# Patient Record
Sex: FEMALE | Race: WHITE | NOT HISPANIC OR LATINO | Employment: OTHER | URBAN - METROPOLITAN AREA
[De-identification: names, ages, dates, MRNs, and addresses within clinical notes are randomized per-mention and may not be internally consistent; named-entity substitution may affect disease eponyms.]

---

## 2019-06-14 ENCOUNTER — HOSPITAL ENCOUNTER (EMERGENCY)
Facility: HOSPITAL | Age: 69
Discharge: HOME | End: 2019-06-14
Attending: EMERGENCY MEDICINE
Payer: MEDICAID

## 2019-06-14 VITALS
HEIGHT: 62 IN | SYSTOLIC BLOOD PRESSURE: 162 MMHG | OXYGEN SATURATION: 96 % | TEMPERATURE: 98 F | RESPIRATION RATE: 22 BRPM | HEART RATE: 91 BPM | DIASTOLIC BLOOD PRESSURE: 72 MMHG

## 2019-06-14 DIAGNOSIS — I10 HYPERTENSION, UNSPECIFIED TYPE: ICD-10-CM

## 2019-06-14 DIAGNOSIS — H61.21 IMPACTED CERUMEN OF RIGHT EAR: ICD-10-CM

## 2019-06-14 DIAGNOSIS — Z86.73 HISTORY OF CVA (CEREBROVASCULAR ACCIDENT) WITHOUT RESIDUAL DEFICITS: ICD-10-CM

## 2019-06-14 DIAGNOSIS — R42 DIZZY: ICD-10-CM

## 2019-06-14 DIAGNOSIS — R00.2 PALPITATIONS: ICD-10-CM

## 2019-06-14 DIAGNOSIS — R42 VERTIGO: Primary | ICD-10-CM

## 2019-06-14 LAB
ALBUMIN SERPL-MCNC: 3.8 G/DL
ALP SERPL-CCNC: 109 U/L (ref 42–141)
BILIRUB SERPL-MCNC: 0.7 MG/DL (ref 0.2–1.6)
BILIRUBIN, POC UA: NEGATIVE
BLOOD, POC UA: ABNORMAL
BUN SERPL-MCNC: 26 MG/DL (ref 7–22)
CALCIUM SERPL-MCNC: 10 MG/DL (ref 8–10.3)
CHLORIDE SERPL-SCNC: 105 MMOL/L (ref 98–108)
CLARITY, POC UA: CLEAR
COLOR, POC UA: YELLOW
CREAT SERPL-MCNC: 1.4 MG/DL (ref 0.6–1.2)
GLUCOSE SERPL-MCNC: 194 MG/DL (ref 73–118)
GLUCOSE, POC UA: NEGATIVE
KETONES, POC UA: NEGATIVE
LEUKOCYTE EST, POC UA: ABNORMAL
NITRITE, POC UA: NEGATIVE
PH UR STRIP: 5.5 [PH]
POC ALT (SGPT): 19 U/L (ref 10–47)
POC AST (SGOT): 21 U/L (ref 11–38)
POC CARDIAC TROPONIN I: 0 NG/ML
POC TCO2: 26 MMOL/L (ref 18–33)
POCT GLUCOSE: 217 MG/DL (ref 70–110)
POTASSIUM BLD-SCNC: 4.8 MMOL/L (ref 3.6–5.1)
PROTEIN, POC UA: ABNORMAL
PROTEIN, POC: 8 G/DL (ref 6.4–8.1)
SAMPLE: NORMAL
SODIUM BLD-SCNC: 140 MMOL/L (ref 128–145)
SPECIFIC GRAVITY, POC UA: 1.02
UROBILINOGEN, POC UA: 0.2 E.U./DL

## 2019-06-14 PROCEDURE — 85025 COMPLETE CBC W/AUTO DIFF WBC: CPT | Mod: ER

## 2019-06-14 PROCEDURE — 81003 URINALYSIS AUTO W/O SCOPE: CPT | Mod: ER

## 2019-06-14 PROCEDURE — 80053 COMPREHEN METABOLIC PANEL: CPT | Mod: ER

## 2019-06-14 PROCEDURE — 93010 EKG 12-LEAD: ICD-10-PCS | Mod: ,,, | Performed by: INTERNAL MEDICINE

## 2019-06-14 PROCEDURE — 84484 ASSAY OF TROPONIN QUANT: CPT | Mod: ER

## 2019-06-14 PROCEDURE — 93005 ELECTROCARDIOGRAM TRACING: CPT | Mod: ER

## 2019-06-14 PROCEDURE — 25000003 PHARM REV CODE 250: Mod: ER | Performed by: EMERGENCY MEDICINE

## 2019-06-14 PROCEDURE — 99285 EMERGENCY DEPT VISIT HI MDM: CPT | Mod: 25,ER

## 2019-06-14 PROCEDURE — 93010 ELECTROCARDIOGRAM REPORT: CPT | Mod: ,,, | Performed by: INTERNAL MEDICINE

## 2019-06-14 RX ORDER — ATENOLOL 50 MG/1
50 TABLET ORAL 2 TIMES DAILY
Qty: 60 TABLET | Refills: 0 | OUTPATIENT
Start: 2019-06-14 | End: 2020-05-10

## 2019-06-14 RX ORDER — MECLIZINE HYDROCHLORIDE 25 MG/1
25 TABLET ORAL
Status: COMPLETED | OUTPATIENT
Start: 2019-06-14 | End: 2019-06-14

## 2019-06-14 RX ORDER — ACETAMINOPHEN 325 MG/1
650 TABLET ORAL
Status: COMPLETED | OUTPATIENT
Start: 2019-06-14 | End: 2019-06-14

## 2019-06-14 RX ORDER — MECLIZINE HYDROCHLORIDE 25 MG/1
25 TABLET ORAL 3 TIMES DAILY PRN
Qty: 20 TABLET | Refills: 0 | Status: SHIPPED | OUTPATIENT
Start: 2019-06-14 | End: 2021-01-08

## 2019-06-14 RX ORDER — ATENOLOL 50 MG/1
50 TABLET ORAL 2 TIMES DAILY
COMMUNITY
End: 2020-05-10

## 2019-06-14 RX ORDER — ATORVASTATIN CALCIUM 80 MG/1
80 TABLET, FILM COATED ORAL DAILY
Qty: 30 TABLET | Refills: 0 | Status: SHIPPED | OUTPATIENT
Start: 2019-06-14 | End: 2020-06-13

## 2019-06-14 RX ORDER — BENAZEPRIL HYDROCHLORIDE 40 MG/1
40 TABLET ORAL DAILY
Qty: 30 TABLET | Refills: 0 | OUTPATIENT
Start: 2019-06-14 | End: 2020-05-10

## 2019-06-14 RX ORDER — ATORVASTATIN CALCIUM 80 MG/1
80 TABLET, FILM COATED ORAL DAILY
COMMUNITY

## 2019-06-14 RX ORDER — BENAZEPRIL HYDROCHLORIDE 40 MG/1
40 TABLET ORAL DAILY
COMMUNITY
End: 2020-05-10

## 2019-06-14 RX ADMIN — ACETAMINOPHEN 650 MG: 325 TABLET, FILM COATED ORAL at 04:06

## 2019-06-14 RX ADMIN — MECLIZINE HYDROCHLORIDE 25 MG: 25 TABLET ORAL at 04:06

## 2019-06-14 NOTE — ED PROVIDER NOTES
"Encounter Date: 6/14/2019    SCRIBE #1 NOTE: I, Carly Baez, am scribing for, and in the presence of,  Dr. Mancilla. I have scribed the following portions of the note - Other sections scribed: HPI, ROS, PE.       History     Chief Complaint   Patient presents with    Dizziness     Pt c/o dizziness, posterior headache, and "my heart is beating too fast"     CC: HA; dizziness  69 y.o female with HTN, HLD, GERD, and hx of stroke presents to the ED with a poster HA, dizziness, and  intermittent palpitations for 3 days. She takes prescription for her palpitations, but is out of it.She also reports she has been out of her HTN medication for 4 to 5 days due to not being able to get her medications refilled. She notes a cough and post tussive emesis. She denies chest pain, leg swelling,  abdominal pain, diarrhea, nausea, fever, neck pain, changes in vision, constipation, changes in urination. She also notes SOB with walking, rhinorrhea, stuffy ears, and chronic back pain.     The history is provided by the patient. No  was used.     Review of patient's allergies indicates:  No Known Allergies  Past Medical History:   Diagnosis Date    Diabetes mellitus     GERD (gastroesophageal reflux disease)     Hyperlipidemia     Hypertension     Stroke      Past Surgical History:   Procedure Laterality Date    Abdominal Mesh Placement       History reviewed. No pertinent family history.  Social History     Tobacco Use    Smoking status: Never Smoker    Smokeless tobacco: Never Used   Substance Use Topics    Alcohol use: Not Currently    Drug use: Never     Review of Systems   Constitutional: Negative for fever.   HENT: Positive for ear pain (feels stuffed up ) and rhinorrhea.    Eyes: Negative for visual disturbance.   Respiratory: Positive for cough and shortness of breath (with walking).    Cardiovascular: Positive for palpitations. Negative for chest pain and leg swelling.   Gastrointestinal: " Positive for vomiting. Negative for abdominal pain, constipation, diarrhea and nausea.   Genitourinary: Negative for decreased urine volume.   Musculoskeletal: Positive for back pain (chronic). Negative for neck pain.   Neurological: Positive for dizziness and headaches (posterior).   All other systems reviewed and are negative.      Physical Exam     Initial Vitals [06/14/19 1606]   BP Pulse Resp Temp SpO2   (!) 177/85 97 18 99 °F (37.2 °C) 98 %      MAP       --         Physical Exam    Nursing note and vitals reviewed.  Constitutional: She appears well-developed and well-nourished. She is not diaphoretic. No distress.   HENT:   Head: Normocephalic and atraumatic.   Right Ear: Tympanic membrane, external ear and ear canal normal. Tympanic membrane is not erythematous. No middle ear effusion.   Left Ear: Tympanic membrane, external ear and ear canal normal. Tympanic membrane is not erythematous.  No middle ear effusion.   Ears:    Eyes: Conjunctivae and EOM are normal. Pupils are equal, round, and reactive to light.   Neck: Normal range of motion. Neck supple.   Cardiovascular: Normal rate, regular rhythm, normal heart sounds and intact distal pulses. Exam reveals no gallop and no friction rub.    No murmur heard.  Pulmonary/Chest: Breath sounds normal. No respiratory distress. She has no wheezes. She has no rhonchi. She has no rales.   Abdominal: Soft. There is no tenderness.   Musculoskeletal: Normal range of motion.   Neurological: She is alert and oriented to person, place, and time. No cranial nerve deficit or sensory deficit. GCS score is 15. GCS eye subscore is 4. GCS verbal subscore is 5. GCS motor subscore is 6.   Skin: Skin is warm and dry. Capillary refill takes less than 2 seconds.   Psychiatric: She has a normal mood and affect. Her behavior is normal.         ED Course   Procedures  Labs Reviewed   POCT URINALYSIS W/O SCOPE - Abnormal; Notable for the following components:       Result Value     Glucose, UA Negative (*)     Bilirubin, UA Negative (*)     Ketones, UA Negative (*)     Blood, UA Trace-intact (*)     Protein, UA Trace (*)     Nitrite, UA Negative (*)     Leukocytes, UA Trace (*)     All other components within normal limits   POCT CMP - Abnormal; Notable for the following components:    POC BUN 26 (*)     POC Creatinine 1.4 (*)     POC Glucose 194 (*)     All other components within normal limits   POCT GLUCOSE - Abnormal; Notable for the following components:    POCT Glucose 217 (*)     All other components within normal limits   TROPONIN ISTAT   POCT URINALYSIS W/O SCOPE   POCT CBC   POCT GLUCOSE MONITORING CONTINUOUS   POCT CMP   POCT TROPONIN     EKG Readings: (Independently Interpreted)   Normal sinus rhythm  Ventricular rate of 94  No ST segment elevation  Nl QT  Nl axis          Imaging Results           CT Head Without Contrast (Final result)  Result time 06/14/19 18:29:10    Final result by Simona Harrison MD (06/14/19 18:29:10)                 Impression:      Nonacute left occipital infarct.  Age-indeterminate left thalamic infarct.    Findings discussed with Dr. Caicedo at 18:20 on 06/14/2019.    This report was flagged in Epic as abnormal.      Electronically signed by: Simona Harrison  Date:    06/14/2019  Time:    18:29             Narrative:    EXAMINATION:  CT OF THE HEAD WITHOUT    CLINICAL HISTORY:  Dizziness and giddinessDizziness;    TECHNIQUE:  5 mm unenhanced axial images were obtained from the skull base to the vertex.    COMPARISON:  None.    FINDINGS:  The ventricles, basal cisterns, and cortical sulci are within normal limits for patient's stated age. There is an age-indeterminate lacunar infarct in the left thalamus.  There is a nonacute territorial infarct involving the left PCA involving the left occipital lobe.  No acute intracranial hemorrhage is detected.  Senescent calcifications are seen in bilateral basal ganglia.  No midline shift is detected.                                  Medical Decision Making:   Initial Assessment:   69 y.o female presents to the ED with a posterior Ha and palpitations for a few days.. Physical exam findings are significant for cerumen in the right ear canal. Heart and lungs sound normal. Good distal pulses. Sensation grossly intact. Neurovascularly intact. Abdomen benign. PERRL and EOM normal.   Clinical Tests:   Lab Tests: Ordered and Reviewed  Medical Tests: Ordered and Reviewed  ED Management:  I will order an EKG, POCT CMP, CBC, troponin, urinalysis, glucose, and CT of the head.  CT shows large widespread old infarct.  There is an area concerning for lacunar infarct that is age indeterminate.  Patient has no neurological deficits.  I do not feel that she is be admitted to the hospital for this.  Labs were significant for mild renal insufficiency.  Patient felt better after meclizine.  The nurse also cleared some debris from her left ear.  Patient's will be prescribed meclizine as well as her blood pressure medications.            Scribe Attestation:   Scribe #1: I performed the above scribed service and the documentation accurately describes the services I performed. I attest to the accuracy of the note.       I, Dr. Gilma Mancilla, personally performed the services described in this documentation. All medical record entries made by the scribe were at my direction and in my presence.  I have reviewed the chart and agree that the record reflects my personal performance and is accurate and complete. Gilma Mancilla MD.  6:53 PM 06/14/2019             Clinical Impression:     1. Vertigo    2. Palpitations    3. Dizzy    4. Impacted cerumen of right ear    5. Hypertension, unspecified type    6. History of CVA (cerebrovascular accident) without residual deficits                                   Gilma Mancilla MD  06/14/19 3896

## 2019-06-14 NOTE — ED NOTES
Pt reports she has been out of Atenolol 50mg BID, Benazepril 50mg QD, and Lipitor 80mg QD for a few days.

## 2019-08-11 ENCOUNTER — HOSPITAL ENCOUNTER (EMERGENCY)
Facility: HOSPITAL | Age: 69
Discharge: HOME | End: 2019-08-11
Attending: EMERGENCY MEDICINE

## 2019-08-11 VITALS
HEART RATE: 99 BPM | TEMPERATURE: 98 F | BODY MASS INDEX: 38.46 KG/M2 | WEIGHT: 209 LBS | RESPIRATION RATE: 16 BRPM | OXYGEN SATURATION: 99 % | DIASTOLIC BLOOD PRESSURE: 89 MMHG | SYSTOLIC BLOOD PRESSURE: 145 MMHG | HEIGHT: 62 IN

## 2019-08-11 DIAGNOSIS — J32.9 RHINOSINUSITIS: Primary | ICD-10-CM

## 2019-08-11 DIAGNOSIS — R05.9 COUGH: ICD-10-CM

## 2019-08-11 LAB — POCT GLUCOSE: 149 MG/DL (ref 70–110)

## 2019-08-11 PROCEDURE — 99284 EMERGENCY DEPT VISIT MOD MDM: CPT | Mod: ER

## 2019-08-11 RX ORDER — BENZONATATE 100 MG/1
100 CAPSULE ORAL 3 TIMES DAILY PRN
Qty: 20 CAPSULE | Refills: 0 | Status: SHIPPED | OUTPATIENT
Start: 2019-08-11 | End: 2019-08-21

## 2019-08-11 RX ORDER — FLUTICASONE PROPIONATE 50 MCG
2 SPRAY, SUSPENSION (ML) NASAL DAILY
Qty: 16 G | Refills: 0 | Status: SHIPPED | OUTPATIENT
Start: 2019-08-11 | End: 2021-01-08

## 2019-08-11 RX ORDER — MONTELUKAST SODIUM 10 MG/1
10 TABLET ORAL NIGHTLY
Qty: 30 TABLET | Refills: 0 | Status: SHIPPED | OUTPATIENT
Start: 2019-08-11 | End: 2019-09-10

## 2019-08-11 RX ORDER — LORATADINE 10 MG/1
10 TABLET ORAL DAILY
Qty: 60 TABLET | Refills: 0 | COMMUNITY
Start: 2019-08-11 | End: 2020-08-10

## 2019-08-11 RX ORDER — PROMETHAZINE HYDROCHLORIDE AND DEXTROMETHORPHAN HYDROBROMIDE 6.25; 15 MG/5ML; MG/5ML
5 SYRUP ORAL NIGHTLY PRN
Qty: 118 ML | Refills: 0 | Status: SHIPPED | OUTPATIENT
Start: 2019-08-11 | End: 2019-08-21

## 2019-08-11 NOTE — ED PROVIDER NOTES
Encounter Date: 8/11/2019    SCRIBE #1 NOTE: I, Carly Baez, am scribing for, and in the presence of,  Dr. Calderon . I have scribed the following portions of the note - Other sections scribed: HPI, ROS, PE .       History     Chief Complaint   Patient presents with    Cough     pt has been having a productive cough with green sputum for 3 days.      69 y.o female with seasonal allergies, HTN, DM, and HLD present to the ED with an acute productive cough with green sputum for 3 days. She took benadryl last night, but none today. She notes sore throat, wheezing, and feeling winded with coughing spells. She denies fever, N/V/D, chest pain, SOB, or trouble swallowing. No hx of asthma. Non smoker. She takes metformin for DM. She states she ran out of her HTN medication (atenolol)  for a few days.     The history is provided by the patient.     Review of patient's allergies indicates:   Allergen Reactions    Sulfa (sulfonamide antibiotics)      Past Medical History:   Diagnosis Date    Diabetes mellitus     GERD (gastroesophageal reflux disease)     Hyperlipidemia     Hypertension     Stroke      Past Surgical History:   Procedure Laterality Date    Abdominal Mesh Placement       History reviewed. No pertinent family history.  Social History     Tobacco Use    Smoking status: Never Smoker    Smokeless tobacco: Never Used   Substance Use Topics    Alcohol use: Not Currently    Drug use: Never     Review of Systems   Constitutional: Negative for fever.   HENT: Positive for congestion, postnasal drip, sneezing and sore throat. Negative for rhinorrhea.    Eyes: Positive for itching (and watery).   Respiratory: Positive for cough (productive ) and wheezing. Negative for shortness of breath.    Cardiovascular: Negative for chest pain, palpitations and leg swelling.   Gastrointestinal: Negative for abdominal pain, diarrhea, nausea and vomiting.   Neurological: Negative for dizziness, syncope and weakness.   All other  systems reviewed and are negative.      Physical Exam     Initial Vitals [08/11/19 1610]   BP Pulse Resp Temp SpO2   (!) 164/76 103 18 98.4 °F (36.9 °C) 96 %      MAP       --         Physical Exam    Nursing note and vitals reviewed.  Constitutional: She appears well-developed and well-nourished. She is not diaphoretic. She is Obese . No distress.   HENT:   Head: Normocephalic and atraumatic.   Right Ear: External ear normal. Tympanic membrane is scarred and bulging. Tympanic membrane is not erythematous. A middle ear effusion is present.   Left Ear: External ear normal. Tympanic membrane is scarred and bulging. Tympanic membrane is not erythematous. A middle ear effusion is present.   Mouth/Throat: Uvula is midline, oropharynx is clear and moist and mucous membranes are normal. No trismus in the jaw. No oropharyngeal exudate, posterior oropharyngeal edema, posterior oropharyngeal erythema or tonsillar abscesses.   Eyes: EOM are normal.   Neck: Normal range of motion.   Cardiovascular: Normal rate, regular rhythm and normal heart sounds. Exam reveals no gallop and no friction rub.    No murmur heard.  Pulmonary/Chest: Breath sounds normal. No respiratory distress. She has no wheezes. She has no rhonchi. She has no rales.   Musculoskeletal: Normal range of motion.   Neurological: She is alert and oriented to person, place, and time. No cranial nerve deficit or sensory deficit.   Skin: Skin is warm and dry.   Psychiatric: She has a normal mood and affect. Her behavior is normal.         ED Course   Procedures  Labs Reviewed   POCT GLUCOSE - Abnormal; Notable for the following components:       Result Value    POCT Glucose 149 (*)     All other components within normal limits          Imaging Results          X-Ray Chest PA And Lateral (Final result)  Result time 08/11/19 16:34:37    Final result by Chloe Serna MD (08/11/19 16:34:37)                 Impression:      Mild hyperinflation.  Minimal atelectasis at  the left base.  No edema or pneumothorax.      Electronically signed by: Chloe Serna  Date:    08/11/2019  Time:    16:34             Narrative:    EXAMINATION:  XR CHEST PA AND LATERAL    CLINICAL HISTORY:  Cough    TECHNIQUE:  PA and lateral views of the chest were performed.    COMPARISON:  None    FINDINGS:  Frontal and lateral views presented.    There is slight streaky opacities at the left base likely subsegmental atelectasis.  Lungs are otherwise clear.  No pneumothorax or pleural effusion.  Trachea and hilar shadows and heart appear normal.  Visualized spine appears intact.  Lateral view shows somewhat flattened hemidiaphragms suggesting hyperinflation.  Visualized bowel gas pattern appears normal.  No acute rib fracture.                                 Medical Decision Making:   Clinical Tests:   Lab Tests: Ordered and Reviewed  Radiological Study: Ordered and Reviewed            Scribe Attestation:   Scribe #1: I performed the above scribed service and the documentation accurately describes the services I performed. I attest to the accuracy of the note.      Labs Reviewed  Admission on 08/11/2019, Discharged on 08/11/2019   Component Date Value Ref Range Status    POCT Glucose 08/11/2019 149* 70 - 110 mg/dL Final        Imaging Reviewed    Imaging Results          X-Ray Chest PA And Lateral (Final result)  Result time 08/11/19 16:34:37    Final result by Chloe Serna MD (08/11/19 16:34:37)                 Impression:      Mild hyperinflation.  Minimal atelectasis at the left base.  No edema or pneumothorax.      Electronically signed by: Chloe Serna  Date:    08/11/2019  Time:    16:34             Narrative:    EXAMINATION:  XR CHEST PA AND LATERAL    CLINICAL HISTORY:  Cough    TECHNIQUE:  PA and lateral views of the chest were performed.    COMPARISON:  None    FINDINGS:  Frontal and lateral views presented.    There is slight streaky opacities at the left base likely subsegmental atelectasis.   Lungs are otherwise clear.  No pneumothorax or pleural effusion.  Trachea and hilar shadows and heart appear normal.  Visualized spine appears intact.  Lateral view shows somewhat flattened hemidiaphragms suggesting hyperinflation.  Visualized bowel gas pattern appears normal.  No acute rib fracture.                                Medications given in ED    Medications - No data to display    This document was produced by a scribe under my direction and in my presence. I agree with the content of the note and have made any necessary edits.     Thanh Calderon MD         Note was created using voice recognition software. Note may have occasional typographical errors that may not have been identified and edited despite good nicholas initial review prior to signing.  Discharge Medications     Discharge Medication List as of 8/11/2019  5:04 PM      START taking these medications    Details   benzonatate (TESSALON) 100 MG capsule Take 1 capsule (100 mg total) by mouth 3 (three) times daily as needed for Cough., Starting Sun 8/11/2019, Until Wed 8/21/2019, Normal      fluticasone propionate (FLONASE) 50 mcg/actuation nasal spray 2 sprays (100 mcg total) by Each Nostril route once daily., Starting Sun 8/11/2019, Normal      loratadine (CLARITIN) 10 mg tablet Take 1 tablet (10 mg total) by mouth once daily., Starting Sun 8/11/2019, Until Mon 8/10/2020, OTC      montelukast (SINGULAIR) 10 mg tablet Take 1 tablet (10 mg total) by mouth every evening., Starting Sun 8/11/2019, Until Tue 9/10/2019, Normal      promethazine-dextromethorphan (PROMETHAZINE-DM) 6.25-15 mg/5 mL Syrp Take 5 mLs by mouth nightly as needed (cough)., Starting Sun 8/11/2019, Until Wed 8/21/2019, Normal         CONTINUE these medications which have NOT CHANGED    Details   !! atenolol (TENORMIN) 50 MG tablet Take 1 tablet (50 mg total) by mouth 2 (two) times daily., Starting Fri 6/14/2019, Until Sat 6/13/2020, Print      !! atenolol (TENORMIN) 50 MG tablet  Take 50 mg by mouth 2 (two) times daily., Historical Med      !! atorvastatin (LIPITOR) 80 MG tablet Take 1 tablet (80 mg total) by mouth once daily., Starting Fri 6/14/2019, Until Sat 6/13/2020, Print      !! atorvastatin (LIPITOR) 80 MG tablet Take 80 mg by mouth once daily., Historical Med      !! benazepril (LOTENSIN) 40 MG tablet Take 1 tablet (40 mg total) by mouth once daily., Starting Fri 6/14/2019, Until Sat 6/13/2020, Print      !! benazepril (LOTENSIN) 40 MG tablet Take 40 mg by mouth once daily. , Historical Med      meclizine (ANTIVERT) 25 mg tablet Take 1 tablet (25 mg total) by mouth 3 (three) times daily as needed., Starting Fri 6/14/2019, Print       !! - Potential duplicate medications found. Please discuss with provider.                Patient discharged to home in stable condition with instructions to:   1. Please take all meds as prescribed.  2. Follow-up with your primary care doctor   3. Return precautions discussed and patient and/or family/caretaker understands to return to the emergency room for any concerns including worsening of your current symptoms, fever, chills, night sweats, worsening pain, chest pain, shortness of breath, nausea, vomiting, diarrhea, bleeding, headache, difficulty talking, visual disturbances, weakness, numbness or any other acute concerns             Clinical Impression:     1. Rhinosinusitis    2. Cough            Disposition:   Disposition: Discharged  Condition: Stable                        Thanh Calderon MD  08/13/19 4845

## 2020-05-10 ENCOUNTER — HOSPITAL ENCOUNTER (EMERGENCY)
Facility: OTHER | Age: 70
Discharge: HOME | End: 2020-05-10
Attending: EMERGENCY MEDICINE

## 2020-05-10 VITALS
OXYGEN SATURATION: 96 % | HEIGHT: 62 IN | BODY MASS INDEX: 41.41 KG/M2 | TEMPERATURE: 99 F | HEART RATE: 55 BPM | DIASTOLIC BLOOD PRESSURE: 75 MMHG | RESPIRATION RATE: 16 BRPM | WEIGHT: 225 LBS | SYSTOLIC BLOOD PRESSURE: 206 MMHG

## 2020-05-10 DIAGNOSIS — N30.01 ACUTE CYSTITIS WITH HEMATURIA: ICD-10-CM

## 2020-05-10 DIAGNOSIS — I10 HYPERTENSION: ICD-10-CM

## 2020-05-10 DIAGNOSIS — R42 EPISODIC LIGHTHEADEDNESS: ICD-10-CM

## 2020-05-10 DIAGNOSIS — R80.9 PROTEINURIA, UNSPECIFIED TYPE: ICD-10-CM

## 2020-05-10 DIAGNOSIS — I10 ESSENTIAL HYPERTENSION: Primary | ICD-10-CM

## 2020-05-10 DIAGNOSIS — R51.9 NONINTRACTABLE EPISODIC HEADACHE, UNSPECIFIED HEADACHE TYPE: ICD-10-CM

## 2020-05-10 LAB
ALBUMIN SERPL BCP-MCNC: 3.8 G/DL (ref 3.5–5.2)
ALP SERPL-CCNC: 93 U/L (ref 55–135)
ALT SERPL W/O P-5'-P-CCNC: 14 U/L (ref 10–44)
ANION GAP SERPL CALC-SCNC: 12 MMOL/L (ref 8–16)
AST SERPL-CCNC: 10 U/L (ref 10–40)
BACTERIA #/AREA URNS HPF: ABNORMAL /HPF
BASOPHILS # BLD AUTO: 0.07 K/UL (ref 0–0.2)
BASOPHILS NFR BLD: 0.7 % (ref 0–1.9)
BILIRUB SERPL-MCNC: 0.6 MG/DL (ref 0.1–1)
BILIRUB UR QL STRIP: NEGATIVE
BNP SERPL-MCNC: 86 PG/ML (ref 0–99)
BUN SERPL-MCNC: 16 MG/DL (ref 8–23)
CALCIUM SERPL-MCNC: 10.1 MG/DL (ref 8.7–10.5)
CHLORIDE SERPL-SCNC: 105 MMOL/L (ref 95–110)
CLARITY UR: ABNORMAL
CO2 SERPL-SCNC: 23 MMOL/L (ref 23–29)
COLOR UR: YELLOW
CREAT SERPL-MCNC: 1.1 MG/DL (ref 0.5–1.4)
DIFFERENTIAL METHOD: ABNORMAL
EOSINOPHIL # BLD AUTO: 0.4 K/UL (ref 0–0.5)
EOSINOPHIL NFR BLD: 3.5 % (ref 0–8)
ERYTHROCYTE [DISTWIDTH] IN BLOOD BY AUTOMATED COUNT: 13.6 % (ref 11.5–14.5)
EST. GFR  (AFRICAN AMERICAN): 59 ML/MIN/1.73 M^2
EST. GFR  (NON AFRICAN AMERICAN): 51 ML/MIN/1.73 M^2
GLUCOSE SERPL-MCNC: 133 MG/DL (ref 70–110)
GLUCOSE UR QL STRIP: NEGATIVE
HCT VFR BLD AUTO: 41.2 % (ref 37–48.5)
HGB BLD-MCNC: 13.2 G/DL (ref 12–16)
HGB UR QL STRIP: ABNORMAL
HYALINE CASTS #/AREA URNS LPF: 0 /LPF
IMM GRANULOCYTES # BLD AUTO: 0.03 K/UL (ref 0–0.04)
IMM GRANULOCYTES NFR BLD AUTO: 0.3 % (ref 0–0.5)
KETONES UR QL STRIP: NEGATIVE
LEUKOCYTE ESTERASE UR QL STRIP: ABNORMAL
LYMPHOCYTES # BLD AUTO: 3.3 K/UL (ref 1–4.8)
LYMPHOCYTES NFR BLD: 31.2 % (ref 18–48)
MCH RBC QN AUTO: 26.6 PG (ref 27–31)
MCHC RBC AUTO-ENTMCNC: 32 G/DL (ref 32–36)
MCV RBC AUTO: 83 FL (ref 82–98)
MICROSCOPIC COMMENT: ABNORMAL
MONOCYTES # BLD AUTO: 0.6 K/UL (ref 0.3–1)
MONOCYTES NFR BLD: 5.4 % (ref 4–15)
NEUTROPHILS # BLD AUTO: 6.3 K/UL (ref 1.8–7.7)
NEUTROPHILS NFR BLD: 58.9 % (ref 38–73)
NITRITE UR QL STRIP: NEGATIVE
NRBC BLD-RTO: 0 /100 WBC
PH UR STRIP: 6 [PH] (ref 5–8)
PLATELET # BLD AUTO: 284 K/UL (ref 150–350)
PMV BLD AUTO: 10.8 FL (ref 9.2–12.9)
POCT GLUCOSE: 145 MG/DL (ref 70–110)
POTASSIUM SERPL-SCNC: 4.9 MMOL/L (ref 3.5–5.1)
PROT SERPL-MCNC: 7.8 G/DL (ref 6–8.4)
PROT UR QL STRIP: ABNORMAL
RBC # BLD AUTO: 4.96 M/UL (ref 4–5.4)
RBC #/AREA URNS HPF: 10 /HPF (ref 0–4)
SODIUM SERPL-SCNC: 140 MMOL/L (ref 136–145)
SP GR UR STRIP: 1.02 (ref 1–1.03)
SQUAMOUS #/AREA URNS HPF: >100 /HPF
URN SPEC COLLECT METH UR: ABNORMAL
UROBILINOGEN UR STRIP-ACNC: NEGATIVE EU/DL
WBC # BLD AUTO: 10.62 K/UL (ref 3.9–12.7)
WBC #/AREA URNS HPF: 15 /HPF (ref 0–5)
WBC CLUMPS URNS QL MICRO: ABNORMAL

## 2020-05-10 PROCEDURE — 82962 GLUCOSE BLOOD TEST: CPT

## 2020-05-10 PROCEDURE — 80053 COMPREHEN METABOLIC PANEL: CPT

## 2020-05-10 PROCEDURE — 63600175 PHARM REV CODE 636 W HCPCS: Performed by: EMERGENCY MEDICINE

## 2020-05-10 PROCEDURE — 93010 ELECTROCARDIOGRAM REPORT: CPT | Mod: ,,, | Performed by: INTERNAL MEDICINE

## 2020-05-10 PROCEDURE — 87086 URINE CULTURE/COLONY COUNT: CPT

## 2020-05-10 PROCEDURE — 85025 COMPLETE CBC W/AUTO DIFF WBC: CPT

## 2020-05-10 PROCEDURE — 99285 EMERGENCY DEPT VISIT HI MDM: CPT | Mod: 25

## 2020-05-10 PROCEDURE — 81000 URINALYSIS NONAUTO W/SCOPE: CPT

## 2020-05-10 PROCEDURE — 93010 EKG 12-LEAD: ICD-10-PCS | Mod: ,,, | Performed by: INTERNAL MEDICINE

## 2020-05-10 PROCEDURE — 25000003 PHARM REV CODE 250: Performed by: EMERGENCY MEDICINE

## 2020-05-10 PROCEDURE — 93005 ELECTROCARDIOGRAM TRACING: CPT

## 2020-05-10 PROCEDURE — 83880 ASSAY OF NATRIURETIC PEPTIDE: CPT

## 2020-05-10 RX ORDER — BENAZEPRIL/HYDROCHLOROTHIAZIDE 20 MG-25MG
1 TABLET ORAL DAILY
Qty: 30 TABLET | Refills: 0 | Status: SHIPPED | OUTPATIENT
Start: 2020-05-10 | End: 2021-01-09 | Stop reason: SDUPTHER

## 2020-05-10 RX ORDER — BENAZEPRIL HYDROCHLORIDE 10 MG/1
40 TABLET ORAL
Status: COMPLETED | OUTPATIENT
Start: 2020-05-10 | End: 2020-05-10

## 2020-05-10 RX ORDER — ATENOLOL 25 MG/1
50 TABLET ORAL
Status: COMPLETED | OUTPATIENT
Start: 2020-05-10 | End: 2020-05-10

## 2020-05-10 RX ORDER — ATENOLOL 50 MG/1
50 TABLET ORAL DAILY
Qty: 30 TABLET | Refills: 0 | Status: SHIPPED | OUTPATIENT
Start: 2020-05-10 | End: 2021-01-09 | Stop reason: SDUPTHER

## 2020-05-10 RX ORDER — BENAZEPRIL HYDROCHLORIDE 40 MG/1
40 TABLET ORAL DAILY
Qty: 30 TABLET | Refills: 0 | Status: SHIPPED | OUTPATIENT
Start: 2020-05-10 | End: 2020-05-10

## 2020-05-10 RX ORDER — ATENOLOL 50 MG/1
50 TABLET ORAL DAILY
Qty: 30 TABLET | Refills: 0 | Status: SHIPPED | OUTPATIENT
Start: 2020-05-10 | End: 2020-05-10 | Stop reason: SDUPTHER

## 2020-05-10 RX ORDER — NITROFURANTOIN 25; 75 MG/1; MG/1
100 CAPSULE ORAL 2 TIMES DAILY
Qty: 10 CAPSULE | Refills: 0 | Status: SHIPPED | OUTPATIENT
Start: 2020-05-10 | End: 2020-05-10 | Stop reason: SDUPTHER

## 2020-05-10 RX ORDER — HYDRALAZINE HYDROCHLORIDE 20 MG/ML
5 INJECTION INTRAMUSCULAR; INTRAVENOUS
Status: DISCONTINUED | OUTPATIENT
Start: 2020-05-10 | End: 2020-05-10 | Stop reason: HOSPADM

## 2020-05-10 RX ORDER — NITROFURANTOIN 25; 75 MG/1; MG/1
100 CAPSULE ORAL 2 TIMES DAILY
Qty: 10 CAPSULE | Refills: 0 | Status: SHIPPED | OUTPATIENT
Start: 2020-05-10 | End: 2020-05-15

## 2020-05-10 RX ADMIN — BENAZEPRIL HYDROCHLORIDE 40 MG: 10 TABLET ORAL at 12:05

## 2020-05-10 RX ADMIN — ATENOLOL 50 MG: 25 TABLET ORAL at 01:05

## 2020-05-10 NOTE — ED NOTES
States her BP at home has been elevated x 3 days, complaint with oral BP meds, states HA, nausea without vomiting x yesterday. Denies dizziness or blurred vision. Pt AAOx4 and appropriate at this time. Respirations even and unlabored. No acute distress noted. Awaiting further orders. Pt updated on POC. Bed is locked and in lowest position with side rails up x2. Call bell within reach and pt oriented to use of call bell. Pt placed on continuous cardiac monitoring.

## 2020-05-10 NOTE — ED NOTES
"Pt is awake and alert, pt states "I'm okay for right now". Denies any current needs, resp pattern even and non labored. The pt's bed is locked in lowest position, call light within reach. Pt instructed to call for any needs. All restroom needs met. WCTM  "

## 2020-05-10 NOTE — ED PROVIDER NOTES
Encounter Date: 5/10/2020    SCRIBE #1 NOTE: I, Todd Queen, am scribing for, and in the presence of, Dr. Hammonds.       History     Chief Complaint   Patient presents with    Hypertension     reports elevated BP at home. reports out of some of her BP medications     Time seen by provider: 12:24 PM    This is a 69 y.o. female with a history of HTN, DM, and HLD who presents with complaint of elevated blood pressure that occurred today. The patient reports that she ran out of her hypertensive medications yesterday. She reports compliance with her medications daily, but ran out yesterday. She reports that she was also experiencing a headache, lightheaded, and frequency. Her headache has resolved since it began. She reports that her blood sugar yesterday was 130, which is normal. She denies fever, sore throat, chest pain, shortness of breath, nausea, vomiting, and dysuria. She denies smoking, drinking, and illicit drug use.     The history is provided by the patient.     Review of patient's allergies indicates:   Allergen Reactions    Sulfa (sulfonamide antibiotics)      Past Medical History:   Diagnosis Date    Diabetes mellitus     GERD (gastroesophageal reflux disease)     Hyperlipidemia     Hypertension     Stroke      Past Surgical History:   Procedure Laterality Date    Abdominal Mesh Placement       No family history on file.  Social History     Tobacco Use    Smoking status: Never Smoker    Smokeless tobacco: Never Used   Substance Use Topics    Alcohol use: Not Currently    Drug use: Never     Review of Systems   Constitutional: Negative for chills and fever.   HENT: Negative for congestion and sore throat.    Eyes: Negative for photophobia and redness.   Respiratory: Negative for cough and shortness of breath.    Cardiovascular: Negative for chest pain.   Gastrointestinal: Negative for abdominal pain, nausea and vomiting.   Genitourinary: Positive for frequency. Negative for dysuria.    Musculoskeletal: Negative for back pain.   Skin: Negative for rash.   Neurological: Positive for dizziness and headaches. Negative for weakness and light-headedness.   Psychiatric/Behavioral: Negative for confusion.       Physical Exam     Initial Vitals [05/10/20 1207]   BP Pulse Resp Temp SpO2   (!) 226/99 61 18 98.7 °F (37.1 °C) 96 %      MAP       --         Physical Exam    Nursing note and vitals reviewed.  Constitutional: She appears well-developed and well-nourished. She is not diaphoretic. No distress.   HENT:   Head: Normocephalic and atraumatic.   Right Ear: Tympanic membrane normal.   Mouth/Throat: Oropharynx is clear and moist and mucous membranes are normal.   Mucous membranes are moist. TMs clear and intact bilaterally, with small amount of wax in the right ear.   Eyes: Conjunctivae and EOM are normal. Pupils are equal, round, and reactive to light. No scleral icterus.   Conjunctivae are pink, clear, and intact.    Neck: Normal range of motion. Neck supple.   Cardiovascular: Normal rate, regular rhythm, S1 normal, S2 normal and normal heart sounds. Exam reveals no gallop and no friction rub.    No murmur heard.  Pulmonary/Chest: Breath sounds normal. No respiratory distress. She has no wheezes. She has no rhonchi. She has no rales.   Lungs clear to auscultation bilaterally.    Abdominal: Soft. Bowel sounds are normal. There is no tenderness. There is no rebound and no guarding.   No audible bruits.    Musculoskeletal: Normal range of motion. She exhibits no edema or tenderness.   No lower extremity edema.    Lymphadenopathy:     She has no cervical adenopathy.   Neurological: She is alert and oriented to person, place, and time.   Skin: Skin is warm and dry. Capillary refill takes less than 2 seconds. No rash noted. No pallor.   No skin tenting. No lesions.   Psychiatric: She has a normal mood and affect. Her behavior is normal. Judgment and thought content normal.         ED Course    Procedures  Labs Reviewed   URINALYSIS, REFLEX TO URINE CULTURE - Abnormal; Notable for the following components:       Result Value    Appearance, UA Hazy (*)     Protein, UA 1+ (*)     Occult Blood UA Trace (*)     Leukocytes, UA Trace (*)     All other components within normal limits    Narrative:     Preferred Collection Type->Urine, Clean Catch   URINALYSIS MICROSCOPIC - Abnormal; Notable for the following components:    RBC, UA 10 (*)     WBC, UA 15 (*)     All other components within normal limits    Narrative:     Preferred Collection Type->Urine, Clean Catch   CBC W/ AUTO DIFFERENTIAL - Abnormal; Notable for the following components:    Mean Corpuscular Hemoglobin 26.6 (*)     All other components within normal limits   COMPREHENSIVE METABOLIC PANEL - Abnormal; Notable for the following components:    Glucose 133 (*)     eGFR if  59 (*)     eGFR if non  51 (*)     All other components within normal limits   POCT GLUCOSE - Abnormal; Notable for the following components:    POCT Glucose 145 (*)     All other components within normal limits   CULTURE, URINE   B-TYPE NATRIURETIC PEPTIDE     EKG Readings: (Independently Interpreted)   Initial Reading: No STEMI.   Normal sinus rhythm at a rate of 61 bpm. No acute ST or T wave changes.        Imaging Results          CT Head Without Contrast (Final result)  Result time 05/10/20 16:28:45    Final result by Rajendra Mendes MD (05/10/20 16:28:45)                 Impression:      1. No acute intracranial process.  2. Remote left occipital cortical infarct.  3. Small remote lacunar infarct of the left thalamus.      Electronically signed by: Rajendra Mendes  Date:    05/10/2020  Time:    16:28             Narrative:    EXAMINATION:  CT HEAD WITHOUT CONTRAST    CLINICAL HISTORY:  Headache, acute, norm neuro exam;    TECHNIQUE:  Low dose axial images were obtained through the head.  Coronal and sagittal reformations were also performed.  Contrast was not administered.    COMPARISON:  06/14/2019    FINDINGS:  The brain appears normally formed.  No evidence of any focal mass or hemorrhage.  No evidence of midline shift or hydrocephalus.    Encephalomalacia of the left occipital lobe suggesting remote cortical infarction.  No significant change.    Small remote lacunar infarct in the left thalamus.    Bilateral senescent basal ganglia calcifications.    Posterior fossa is within normal limits.    Mild involutional changes.    No evidence of calvarial fracture.    The orbits and globes appear adequately maintained.    Probable small mucous retention cyst in the sphenoid sinus on the left.    No significant change.                                 Medical Decision Making:   History:   Old Medical Records: I decided to obtain old medical records.  Independently Interpreted Test(s):   I have ordered and independently interpreted EKG Reading(s) - see prior notes  Clinical Tests:   Lab Tests: Ordered and Reviewed  Medical Tests: Ordered and Reviewed            Scribe Attestation:   Scribe #1: I performed the above scribed service and the documentation accurately describes the services I performed. I attest to the accuracy of the note.    Attending Attestation:           Physician Attestation for Scribe:  Physician Attestation Statement for Scribe #1: I, Dr. Hammonds, reviewed documentation, as scribed by Todd Queen in my presence, and it is both accurate and complete.         Attending ED Notes:     Emergent evaluation a 69-year-old female with complaint of an elevation of blood pressure since she ran out of her medications yesterday.   Patient also complains of urinary frequency. Patient was unable to take her blood pressure medications yesterday or today.  Patient complains of associated transient episode of lightheadedness and a transient headache.   Patient is afebrile, nontoxic-appearing stable vital signs except for elevation of blood pressure.   Patient is neurovascularly intact without any focal neurologic deficits. Cranial nerves II-XII intact. Strength 5/5 throughout. Sensation intact to light touch throughout. Good finger to nose task ability. Negative pronator drift. Two point discrimination is intact throughout. Reflexes 2+ throughout. No papilledema.  No meningeal signs. PERRLA. EOMI.    Patient is administered her home blood pressure medication.  Patient's blood pressure was reviewed in [facility] on her 2 prior visits last year which revealed an average systolic blood pressure of 170.  Prior to discharge repeat blood pressure by MD reveals systolic blood pressure of 180. No clinical evidence of acute end-organ damage. Patient denies any chest pain or shortness of breath.  Patient denies current headache.  Patient has no elevation white blood cell count.  H&H within normal limits.  No acute findings on CMP.   BNP is 86.  Urinary analysis reveals urinary tract infection.  No acute findings on CT of the head.   With the patient's approval and consent I did speak with patient's daughter regarding her history of present illness, clinical presentation, physical exam in all diagnostic and laboratory findings.  The patient is extensively counseled her diagnosis and treatment including all diagnostic, laboratory and physical exam findings.  The patient is discharged good condition and directed to follow up with her PCP or virtual clinic visit within the next 24-48 hours.                          Clinical Impression:     1. Essential hypertension    2. Hypertension    3. Episodic lightheadedness    4. Nonintractable episodic headache, unspecified headache type    5. Acute cystitis with hematuria    6. Proteinuria, unspecified type              ED Disposition Condition    Discharge Good        ED Prescriptions     Medication Sig Dispense Start Date End Date Auth. Provider    nitrofurantoin, macrocrystal-monohydrate, (MACROBID) 100 MG capsule  (Status: Discontinued)  Take 1 capsule (100 mg total) by mouth 2 (two) times daily. for 5 days 10 capsule 5/10/2020 5/10/2020 Mo Wilson MD    benazepriL (LOTENSIN) 40 MG tablet  (Status: Discontinued) Take 1 tablet (40 mg total) by mouth once daily. 30 tablet 5/10/2020 5/10/2020 Mo Wilson MD    atenoloL (TENORMIN) 50 MG tablet  (Status: Discontinued) Take 1 tablet (50 mg total) by mouth once daily. 30 tablet 5/10/2020 5/10/2020 Mo Wilson MD    benazepril-hydrochlorthiazide (LOTENSIN HCT) 20-25 mg Tab Take 1 tablet by mouth once daily. 30 tablet 5/10/2020 6/9/2020 Mo Wilson MD    nitrofurantoin, macrocrystal-monohydrate, (MACROBID) 100 MG capsule Take 1 capsule (100 mg total) by mouth 2 (two) times daily. for 5 days 10 capsule 5/10/2020 5/15/2020 Mo Wilson MD    atenoloL (TENORMIN) 50 MG tablet Take 1 tablet (50 mg total) by mouth once daily. 30 tablet 5/10/2020 6/9/2020 Mo Wilson MD        Follow-up Information     Follow up With Specialties Details Why Contact Info    [facility]  In 2 days  [address]  [facility] [address]    [employer] [facility], VIRTUAL VISIT  In 2 days Log into [email or web address] to sign up for your account                                      Mo Wilson MD  05/10/20 0152

## 2020-05-12 LAB — BACTERIA UR CULT: NORMAL

## 2020-11-21 ENCOUNTER — HOSPITAL ENCOUNTER (EMERGENCY)
Facility: OTHER | Age: 70
Discharge: HOME | End: 2020-11-22
Attending: EMERGENCY MEDICINE
Payer: MEDICAID

## 2020-11-21 DIAGNOSIS — U07.1 COVID-19 VIRUS INFECTION: Primary | ICD-10-CM

## 2020-11-21 DIAGNOSIS — Z20.822 SUSPECTED COVID-19 VIRUS INFECTION: ICD-10-CM

## 2020-11-21 LAB
ALBUMIN SERPL BCP-MCNC: 3.8 G/DL (ref 3.5–5.2)
ALP SERPL-CCNC: 92 U/L (ref 55–135)
ALT SERPL W/O P-5'-P-CCNC: 19 U/L (ref 10–44)
ANION GAP SERPL CALC-SCNC: 11 MMOL/L (ref 8–16)
AST SERPL-CCNC: 20 U/L (ref 10–40)
BASOPHILS # BLD AUTO: 0.06 K/UL (ref 0–0.2)
BASOPHILS NFR BLD: 0.6 % (ref 0–1.9)
BILIRUB SERPL-MCNC: 0.9 MG/DL (ref 0.1–1)
BUN SERPL-MCNC: 17 MG/DL (ref 8–23)
CALCIUM SERPL-MCNC: 9.4 MG/DL (ref 8.7–10.5)
CHLORIDE SERPL-SCNC: 103 MMOL/L (ref 95–110)
CK SERPL-CCNC: 201 U/L (ref 20–180)
CO2 SERPL-SCNC: 20 MMOL/L (ref 23–29)
CREAT SERPL-MCNC: 1.4 MG/DL (ref 0.5–1.4)
CRP SERPL-MCNC: 16.4 MG/L (ref 0–8.2)
CTP QC/QA: YES
CTP QC/QA: YES
DIFFERENTIAL METHOD: ABNORMAL
EOSINOPHIL # BLD AUTO: 0 K/UL (ref 0–0.5)
EOSINOPHIL NFR BLD: 0.3 % (ref 0–8)
ERYTHROCYTE [DISTWIDTH] IN BLOOD BY AUTOMATED COUNT: 13.6 % (ref 11.5–14.5)
EST. GFR  (AFRICAN AMERICAN): 44 ML/MIN/1.73 M^2
EST. GFR  (NON AFRICAN AMERICAN): 38 ML/MIN/1.73 M^2
GLUCOSE SERPL-MCNC: 137 MG/DL (ref 70–110)
HCT VFR BLD AUTO: 40.8 % (ref 37–48.5)
HGB BLD-MCNC: 13 G/DL (ref 12–16)
IMM GRANULOCYTES # BLD AUTO: 0.02 K/UL (ref 0–0.04)
IMM GRANULOCYTES NFR BLD AUTO: 0.2 % (ref 0–0.5)
LDH SERPL L TO P-CCNC: 199 U/L (ref 110–260)
LYMPHOCYTES # BLD AUTO: 3 K/UL (ref 1–4.8)
LYMPHOCYTES NFR BLD: 30.2 % (ref 18–48)
MCH RBC QN AUTO: 26.8 PG (ref 27–31)
MCHC RBC AUTO-ENTMCNC: 31.9 G/DL (ref 32–36)
MCV RBC AUTO: 84 FL (ref 82–98)
MONOCYTES # BLD AUTO: 0.9 K/UL (ref 0.3–1)
MONOCYTES NFR BLD: 8.6 % (ref 4–15)
NEUTROPHILS # BLD AUTO: 6 K/UL (ref 1.8–7.7)
NEUTROPHILS NFR BLD: 60.1 % (ref 38–73)
NRBC BLD-RTO: 0 /100 WBC
PLATELET # BLD AUTO: 222 K/UL (ref 150–350)
PMV BLD AUTO: 10.8 FL (ref 9.2–12.9)
POC MOLECULAR INFLUENZA A AGN: NEGATIVE
POC MOLECULAR INFLUENZA B AGN: NEGATIVE
POTASSIUM SERPL-SCNC: 4 MMOL/L (ref 3.5–5.1)
PROT SERPL-MCNC: 8 G/DL (ref 6–8.4)
RBC # BLD AUTO: 4.85 M/UL (ref 4–5.4)
SARS-COV-2 RDRP RESP QL NAA+PROBE: POSITIVE
SODIUM SERPL-SCNC: 134 MMOL/L (ref 136–145)
WBC # BLD AUTO: 9.96 K/UL (ref 3.9–12.7)

## 2020-11-21 PROCEDURE — 25000003 PHARM REV CODE 250: Performed by: EMERGENCY MEDICINE

## 2020-11-21 PROCEDURE — 84145 PROCALCITONIN (PCT): CPT

## 2020-11-21 PROCEDURE — U0002 COVID-19 LAB TEST NON-CDC: HCPCS | Performed by: EMERGENCY MEDICINE

## 2020-11-21 PROCEDURE — 86140 C-REACTIVE PROTEIN: CPT

## 2020-11-21 PROCEDURE — 86803 HEPATITIS C AB TEST: CPT

## 2020-11-21 PROCEDURE — 82728 ASSAY OF FERRITIN: CPT

## 2020-11-21 PROCEDURE — 93005 ELECTROCARDIOGRAM TRACING: CPT

## 2020-11-21 PROCEDURE — 96374 THER/PROPH/DIAG INJ IV PUSH: CPT

## 2020-11-21 PROCEDURE — 82550 ASSAY OF CK (CPK): CPT

## 2020-11-21 PROCEDURE — 99285 EMERGENCY DEPT VISIT HI MDM: CPT | Mod: 25

## 2020-11-21 PROCEDURE — 93010 EKG 12-LEAD: ICD-10-PCS | Mod: ,,, | Performed by: INTERNAL MEDICINE

## 2020-11-21 PROCEDURE — 87040 BLOOD CULTURE FOR BACTERIA: CPT | Mod: 59

## 2020-11-21 PROCEDURE — 80053 COMPREHEN METABOLIC PANEL: CPT

## 2020-11-21 PROCEDURE — 96361 HYDRATE IV INFUSION ADD-ON: CPT

## 2020-11-21 PROCEDURE — 83615 LACTATE (LD) (LDH) ENZYME: CPT

## 2020-11-21 PROCEDURE — 36415 COLL VENOUS BLD VENIPUNCTURE: CPT

## 2020-11-21 PROCEDURE — 83605 ASSAY OF LACTIC ACID: CPT

## 2020-11-21 PROCEDURE — 93010 ELECTROCARDIOGRAM REPORT: CPT | Mod: ,,, | Performed by: INTERNAL MEDICINE

## 2020-11-21 PROCEDURE — 85025 COMPLETE CBC W/AUTO DIFF WBC: CPT

## 2020-11-21 RX ORDER — METFORMIN HYDROCHLORIDE 750 MG/1
1000 TABLET, EXTENDED RELEASE ORAL
COMMUNITY

## 2020-11-21 RX ORDER — SODIUM CHLORIDE 9 MG/ML
1000 INJECTION, SOLUTION INTRAVENOUS
Status: COMPLETED | OUTPATIENT
Start: 2020-11-21 | End: 2020-11-21

## 2020-11-21 RX ORDER — ACETAMINOPHEN 500 MG
1000 TABLET ORAL
Status: COMPLETED | OUTPATIENT
Start: 2020-11-21 | End: 2020-11-21

## 2020-11-21 RX ADMIN — ACETAMINOPHEN 1000 MG: 500 TABLET, FILM COATED ORAL at 10:11

## 2020-11-21 RX ADMIN — SODIUM CHLORIDE 1000 ML: 0.9 INJECTION, SOLUTION INTRAVENOUS at 10:11

## 2020-11-22 VITALS
BODY MASS INDEX: 38.71 KG/M2 | HEIGHT: 61 IN | SYSTOLIC BLOOD PRESSURE: 151 MMHG | TEMPERATURE: 99 F | WEIGHT: 205 LBS | OXYGEN SATURATION: 96 % | RESPIRATION RATE: 19 BRPM | DIASTOLIC BLOOD PRESSURE: 65 MMHG | HEART RATE: 62 BPM

## 2020-11-22 LAB
BILIRUB UR QL STRIP: NEGATIVE
CLARITY UR: CLEAR
COLOR UR: YELLOW
FERRITIN SERPL-MCNC: 229 NG/ML (ref 20–300)
GLUCOSE UR QL STRIP: NEGATIVE
HCV AB SERPL QL IA: NEGATIVE
HGB UR QL STRIP: ABNORMAL
KETONES UR QL STRIP: ABNORMAL
LACTATE SERPL-SCNC: 1 MMOL/L (ref 0.5–2.2)
LEUKOCYTE ESTERASE UR QL STRIP: NEGATIVE
NITRITE UR QL STRIP: NEGATIVE
PH UR STRIP: 7 [PH] (ref 5–8)
PROCALCITONIN SERPL IA-MCNC: 0.05 NG/ML
PROT UR QL STRIP: NEGATIVE
SP GR UR STRIP: 1.01 (ref 1–1.03)
URN SPEC COLLECT METH UR: ABNORMAL
UROBILINOGEN UR STRIP-ACNC: NEGATIVE EU/DL

## 2020-11-22 PROCEDURE — 63600175 PHARM REV CODE 636 W HCPCS: Performed by: EMERGENCY MEDICINE

## 2020-11-22 PROCEDURE — 81003 URINALYSIS AUTO W/O SCOPE: CPT

## 2020-11-22 PROCEDURE — 25000003 PHARM REV CODE 250: Performed by: EMERGENCY MEDICINE

## 2020-11-22 RX ORDER — ATENOLOL 25 MG/1
50 TABLET ORAL
Status: COMPLETED | OUTPATIENT
Start: 2020-11-22 | End: 2020-11-22

## 2020-11-22 RX ORDER — ONDANSETRON 8 MG/1
8 TABLET, ORALLY DISINTEGRATING ORAL EVERY 6 HOURS PRN
Qty: 15 TABLET | Refills: 0 | Status: SHIPPED | OUTPATIENT
Start: 2020-11-22

## 2020-11-22 RX ORDER — ONDANSETRON 2 MG/ML
4 INJECTION INTRAMUSCULAR; INTRAVENOUS
Status: COMPLETED | OUTPATIENT
Start: 2020-11-22 | End: 2020-11-22

## 2020-11-22 RX ADMIN — ATENOLOL 50 MG: 25 TABLET ORAL at 12:11

## 2020-11-22 RX ADMIN — ONDANSETRON 4 MG: 2 INJECTION INTRAMUSCULAR; INTRAVENOUS at 01:11

## 2020-11-22 NOTE — ED PROVIDER NOTES
"Encounter Date: 11/21/2020    SCRIBE #1 NOTE: I, Morteza Marcus, am scribing for, and in the presence of, Dr. Blancahrd.       History     Chief Complaint   Patient presents with    Hypertension     Reports checking BP at home and pressure was "170's/90". States she was concerned due to hx of stroke. Denies chest pain and SOB     Time seen by provider: 11:22 PM    This is a 70 y.o. female with history of HTN, DM, HLD, and a stroke who presents with complaint of HTN. She reports that she measured her BP because she had a headache (since this evening) and that it read 170/90 while it is normally around 150/80.  She states that she normally gets a headache when her blood pressure is elevated.  She initially denies any other associated symptoms but did ultimately admit to nausea, urinary frequency (since today), a non-productive cough which started last night. She denies dyspnea, chills, body aches, vomiting, sore throat, diarrhea, body aches, or rashes. She denies ear pain but states that her ears are "stuffed up". She reports a normal appetite.  Patient and daughter deny any known sick contacts.  Per the daughter, the patient does not go anywhere and has had contact only with the other members of the household which included the daughter, granddaughter (who is home-schooled), and  (tested negative yesterday as part of pre-surgery testing).      The history is provided by the patient.     Review of patient's allergies indicates:   Allergen Reactions    Sulfa (sulfonamide antibiotics)      Past Medical History:   Diagnosis Date    Diabetes mellitus     GERD (gastroesophageal reflux disease)     Hyperlipidemia     Hypertension     Stroke      Past Surgical History:   Procedure Laterality Date    Abdominal Mesh Placement       No family history on file.  Social History     Tobacco Use    Smoking status: Never Smoker    Smokeless tobacco: Never Used   Substance Use Topics    Alcohol use: Not Currently    Drug " use: Never     Review of Systems   Constitutional: Positive for fever. Negative for appetite change and chills.        Negative for body aches.   HENT: Negative for ear pain and sore throat.    Eyes: Negative for visual disturbance.   Respiratory: Positive for cough.    Gastrointestinal: Positive for nausea. Negative for diarrhea and vomiting.   Genitourinary: Positive for frequency.   Musculoskeletal: Negative for back pain.   Skin: Negative for rash.   Neurological: Positive for headaches.   Psychiatric/Behavioral: Negative for behavioral problems.       Physical Exam     Initial Vitals [11/21/20 2156]   BP Pulse Resp Temp SpO2   (!) 149/76 80 (!) 22 (!) 101.3 °F (38.5 °C) (!) 94 %      MAP       --         Physical Exam    Nursing note and vitals reviewed.  Constitutional: She appears well-developed and well-nourished. No distress.   HENT:   Head: Normocephalic and atraumatic.   Right Ear: External ear normal.   Left Ear: External ear normal.   Eyes: EOM are normal.   Cardiovascular: Normal rate, regular rhythm and normal heart sounds. Exam reveals no gallop and no friction rub.    No murmur heard.  Pulmonary/Chest: Breath sounds normal. No respiratory distress. She has no wheezes. She has no rhonchi. She has no rales.   Abdominal: Soft. She exhibits no distension. There is no abdominal tenderness. There is no rebound and no guarding.   Neurological: She is alert and oriented to person, place, and time.   Psychiatric: She has a normal mood and affect. Her behavior is normal. Thought content normal.         ED Course   Procedures  Labs Reviewed   CBC W/ AUTO DIFFERENTIAL - Abnormal; Notable for the following components:       Result Value    MCH 26.8 (*)     MCHC 31.9 (*)     All other components within normal limits   SARS-COV-2 RDRP GENE - Abnormal; Notable for the following components:    POC Rapid COVID Positive (*)     All other components within normal limits   CULTURE, BLOOD   CULTURE, BLOOD   C-REACTIVE  PROTEIN   FERRITIN   LACTATE DEHYDROGENASE   CK   HEPATITIS C ANTIBODY   COMPREHENSIVE METABOLIC PANEL   LACTIC ACID, PLASMA   URINALYSIS, REFLEX TO URINE CULTURE   PROCALCITONIN   CK   C-REACTIVE PROTEIN   LACTATE DEHYDROGENASE   FERRITIN   POCT INFLUENZA A/B MOLECULAR     EKG Readings: (Independently Interpreted)   Sinus rate of 71, Q waves in V1 and V2 (also present in May 2020 EKG), normal interval,s no significant ST wave changes compared to previous EKG.       Imaging Results          X-Ray Chest AP Portable (In process)  Result time 11/21/20 23:21:42              X-Rays:   Independently Interpreted Readings:   Chest X-Ray: No infiltrates. Blunting of left costophrenic angle.     Medical Decision Making:   History:   Old Medical Records: I decided to obtain old medical records.  Independently Interpreted Test(s):   I have ordered and independently interpreted X-rays - see prior notes.  I have ordered and independently interpreted EKG Reading(s) - see prior notes  Clinical Tests:   Lab Tests: Ordered and Reviewed  Radiological Study: Ordered and Reviewed  Medical Tests: Ordered and Reviewed    Additional MDM:   Comments: 70-year-old female with history of hypertension, hyperlipidemia, diabetes and a previous CVA presents complaining of elevated blood pressure tonight.  The patient's concern was that headache secondary to her elevated blood pressure.  Daughter brought her into the emergency department given her history of a previous CVA and this elevated blood pressure reading relative to her baseline.  It was only upon further questioning that she admitted to having a nonproductive cough starting last night and nausea.  She denied having any dyspnea.  Initial oxygen saturation 94% on room air, however, this did improved to 97% without any interventions.  Given her fever, an infectious workup was initiated including a COVID-19 test.  She was positive for COVID-19.  The remainder of her workup was remarkable only  for mildly elevated CRP and CPK.  X-ray also showed a small pleural effusion on the left.  Throughout her emergency department stay the patient remained well-appearing and in no respiratory distress.  I did discuss with the patient and her family strict precautions for seeking immediate re-evaluation.  She was discharged home with a pulse ox a and instructions on supportive care for home.  Was also stressed to the patient and her daughter the importance of wearing a mask at all times at home given her close contact with multiple other family members..          Scribe Attestation:   Scribe #1: I performed the above scribed service and the documentation accurately describes the services I performed. I attest to the accuracy of the note.    Attending Attestation:           Physician Attestation for Scribe:  Physician Attestation Statement for Scribe #1: I, Dr. Blanchard, reviewed documentation, as scribed by Morteza Marcus in my presence, and it is both accurate and complete.                           Clinical Impression:       ICD-10-CM ICD-9-CM   1. Suspected COVID-19 virus infection  Z20.828 V01.79                                               Juanita Blanchard MD  11/22/20 0230

## 2020-11-22 NOTE — ED TRIAGE NOTES
Pt presents to the ED w/ c/o high blood pressure.  Reports home pressures in the 170s systolic.  Reports hx of stroke.  Denies weakness, slurred speech, nausea, vomiting, CP, SOB.  Endorses rhinorrhea, fever.

## 2020-11-23 ENCOUNTER — TELEPHONE (OUTPATIENT)
Dept: EMERGENCY MEDICINE | Facility: OTHER | Age: 70
End: 2020-11-23

## 2020-11-23 DIAGNOSIS — U07.1 COVID-19 VIRUS INFECTION: Primary | ICD-10-CM

## 2020-11-27 LAB
BACTERIA BLD CULT: NORMAL
BACTERIA BLD CULT: NORMAL

## 2021-01-08 ENCOUNTER — HOSPITAL ENCOUNTER (EMERGENCY)
Facility: HOSPITAL | Age: 71
Discharge: HOME | End: 2021-01-09
Attending: INTERNAL MEDICINE
Payer: MEDICAID

## 2021-01-08 VITALS
WEIGHT: 205 LBS | BODY MASS INDEX: 37.73 KG/M2 | HEIGHT: 62 IN | OXYGEN SATURATION: 97 % | HEART RATE: 62 BPM | DIASTOLIC BLOOD PRESSURE: 86 MMHG | SYSTOLIC BLOOD PRESSURE: 184 MMHG | RESPIRATION RATE: 22 BRPM | TEMPERATURE: 99 F

## 2021-01-08 DIAGNOSIS — R07.89 CHEST DISCOMFORT: ICD-10-CM

## 2021-01-08 DIAGNOSIS — I10 ESSENTIAL HYPERTENSION: Primary | ICD-10-CM

## 2021-01-08 DIAGNOSIS — R07.9 CHEST PAIN: ICD-10-CM

## 2021-01-08 LAB
ALBUMIN SERPL-MCNC: 3.6 G/DL (ref 3.3–5.5)
ALP SERPL-CCNC: 81 U/L (ref 42–141)
BILIRUB SERPL-MCNC: 0.9 MG/DL (ref 0.2–1.6)
BUN SERPL-MCNC: 14 MG/DL (ref 7–22)
CALCIUM SERPL-MCNC: 10.6 MG/DL (ref 8–10.3)
CHLORIDE SERPL-SCNC: 104 MMOL/L (ref 98–108)
CREAT SERPL-MCNC: 0.8 MG/DL (ref 0.6–1.2)
GLUCOSE SERPL-MCNC: 138 MG/DL (ref 73–118)
POC ALT (SGPT): 17 U/L (ref 10–47)
POC AST (SGOT): 19 U/L (ref 11–38)
POC CARDIAC TROPONIN I: 0 NG/ML
POC TCO2: 24 MMOL/L (ref 18–33)
POCT GLUCOSE: 149 MG/DL (ref 70–110)
POTASSIUM BLD-SCNC: 3.8 MMOL/L (ref 3.6–5.1)
PROTEIN, POC: 7.8 G/DL (ref 6.4–8.1)
SAMPLE: NORMAL
SODIUM BLD-SCNC: 142 MMOL/L (ref 128–145)

## 2021-01-08 PROCEDURE — 99285 EMERGENCY DEPT VISIT HI MDM: CPT | Mod: 25,ER

## 2021-01-08 PROCEDURE — 25000003 PHARM REV CODE 250: Mod: ER | Performed by: INTERNAL MEDICINE

## 2021-01-08 PROCEDURE — 84484 ASSAY OF TROPONIN QUANT: CPT | Mod: ER

## 2021-01-08 PROCEDURE — 80053 COMPREHEN METABOLIC PANEL: CPT | Mod: ER

## 2021-01-08 PROCEDURE — 93010 EKG 12-LEAD: ICD-10-PCS | Mod: ,,, | Performed by: INTERNAL MEDICINE

## 2021-01-08 PROCEDURE — 82962 GLUCOSE BLOOD TEST: CPT | Mod: ER

## 2021-01-08 PROCEDURE — 85025 COMPLETE CBC W/AUTO DIFF WBC: CPT | Mod: ER

## 2021-01-08 PROCEDURE — 93005 ELECTROCARDIOGRAM TRACING: CPT | Mod: ER

## 2021-01-08 PROCEDURE — 93010 ELECTROCARDIOGRAM REPORT: CPT | Mod: ,,, | Performed by: INTERNAL MEDICINE

## 2021-01-08 RX ORDER — ATENOLOL 25 MG/1
50 TABLET ORAL
Status: COMPLETED | OUTPATIENT
Start: 2021-01-08 | End: 2021-01-08

## 2021-01-08 RX ADMIN — ATENOLOL 50 MG: 25 TABLET ORAL at 10:01

## 2021-01-09 PROBLEM — I10 ESSENTIAL HYPERTENSION: Status: ACTIVE | Noted: 2021-01-09

## 2021-01-09 PROBLEM — R07.9 CHEST PAIN: Status: ACTIVE | Noted: 2021-01-09

## 2021-01-09 PROBLEM — R07.89 CHEST DISCOMFORT: Status: ACTIVE | Noted: 2021-01-09

## 2021-01-09 RX ORDER — ATENOLOL 50 MG/1
50 TABLET ORAL DAILY
Qty: 30 TABLET | Refills: 0 | Status: SHIPPED | OUTPATIENT
Start: 2021-01-09 | End: 2021-02-08

## 2021-01-09 RX ORDER — BENAZEPRIL/HYDROCHLOROTHIAZIDE 20 MG-25MG
1 TABLET ORAL DAILY
Qty: 30 TABLET | Refills: 0 | Status: SHIPPED | OUTPATIENT
Start: 2021-01-09 | End: 2022-06-20

## 2021-09-27 ENCOUNTER — NURSE TRIAGE (OUTPATIENT)
Dept: ADMINISTRATIVE | Facility: CLINIC | Age: 71
End: 2021-09-27

## 2022-03-11 ENCOUNTER — HOSPITAL ENCOUNTER (EMERGENCY)
Facility: HOSPITAL | Age: 72
Discharge: HOME | End: 2022-03-11
Attending: EMERGENCY MEDICINE
Payer: MEDICAID

## 2022-03-11 VITALS
DIASTOLIC BLOOD PRESSURE: 78 MMHG | SYSTOLIC BLOOD PRESSURE: 145 MMHG | OXYGEN SATURATION: 100 % | HEART RATE: 62 BPM | RESPIRATION RATE: 18 BRPM | WEIGHT: 207 LBS | BODY MASS INDEX: 38.09 KG/M2 | TEMPERATURE: 98 F | HEIGHT: 62 IN

## 2022-03-11 DIAGNOSIS — J40 BRONCHITIS: Primary | ICD-10-CM

## 2022-03-11 DIAGNOSIS — R05.9 COUGH: ICD-10-CM

## 2022-03-11 DIAGNOSIS — R07.89 CHEST DISCOMFORT: ICD-10-CM

## 2022-03-11 DIAGNOSIS — I10 ESSENTIAL HYPERTENSION: ICD-10-CM

## 2022-03-11 LAB
ALBUMIN SERPL-MCNC: 3.6 G/DL (ref 3.3–5.5)
ALP SERPL-CCNC: 80 U/L (ref 42–141)
BILIRUB SERPL-MCNC: 0.8 MG/DL (ref 0.2–1.6)
BUN SERPL-MCNC: 16 MG/DL (ref 7–22)
CALCIUM SERPL-MCNC: 10 MG/DL (ref 8–10.3)
CHLORIDE SERPL-SCNC: 110 MMOL/L (ref 98–108)
CREAT SERPL-MCNC: 1.1 MG/DL (ref 0.6–1.2)
CTP QC/QA: YES
GLUCOSE SERPL-MCNC: 171 MG/DL (ref 73–118)
INFLUENZA A ANTIGEN, POC: NEGATIVE
INFLUENZA B ANTIGEN, POC: NEGATIVE
POC ALT (SGPT): 10 U/L (ref 10–47)
POC AST (SGOT): 18 U/L (ref 11–38)
POC B-TYPE NATRIURETIC PEPTIDE: 155 PG/ML (ref 0–100)
POC CARDIAC TROPONIN I: 0 NG/ML
POC TCO2: 24 MMOL/L (ref 18–33)
POTASSIUM BLD-SCNC: 4.5 MMOL/L (ref 3.6–5.1)
PROTEIN, POC: 7.3 G/DL (ref 6.4–8.1)
SAMPLE: NORMAL
SARS-COV-2 RDRP RESP QL NAA+PROBE: NEGATIVE
SODIUM BLD-SCNC: 139 MMOL/L (ref 128–145)

## 2022-03-11 PROCEDURE — 84484 ASSAY OF TROPONIN QUANT: CPT | Mod: ER

## 2022-03-11 PROCEDURE — 87804 INFLUENZA ASSAY W/OPTIC: CPT | Mod: 59,ER

## 2022-03-11 PROCEDURE — 83880 ASSAY OF NATRIURETIC PEPTIDE: CPT | Mod: ER

## 2022-03-11 PROCEDURE — U0002 COVID-19 LAB TEST NON-CDC: HCPCS | Mod: ER | Performed by: EMERGENCY MEDICINE

## 2022-03-11 PROCEDURE — 80053 COMPREHEN METABOLIC PANEL: CPT | Mod: ER

## 2022-03-11 PROCEDURE — 99284 EMERGENCY DEPT VISIT MOD MDM: CPT | Mod: 25,ER

## 2022-03-11 PROCEDURE — 94640 AIRWAY INHALATION TREATMENT: CPT | Mod: ER

## 2022-03-11 PROCEDURE — 94760 N-INVAS EAR/PLS OXIMETRY 1: CPT | Mod: ER

## 2022-03-11 PROCEDURE — 85025 COMPLETE CBC W/AUTO DIFF WBC: CPT | Mod: ER

## 2022-03-11 PROCEDURE — 25000242 PHARM REV CODE 250 ALT 637 W/ HCPCS: Mod: ER | Performed by: EMERGENCY MEDICINE

## 2022-03-11 RX ORDER — IPRATROPIUM BROMIDE AND ALBUTEROL SULFATE 2.5; .5 MG/3ML; MG/3ML
3 SOLUTION RESPIRATORY (INHALATION)
Status: COMPLETED | OUTPATIENT
Start: 2022-03-11 | End: 2022-03-11

## 2022-03-11 RX ORDER — ALBUTEROL SULFATE 90 UG/1
1-2 AEROSOL, METERED RESPIRATORY (INHALATION) EVERY 6 HOURS PRN
Qty: 8 G | Refills: 1 | Status: SHIPPED | OUTPATIENT
Start: 2022-03-11 | End: 2022-03-25

## 2022-03-11 RX ORDER — BENZONATATE 100 MG/1
200 CAPSULE ORAL 3 TIMES DAILY PRN
Qty: 20 CAPSULE | Refills: 0 | Status: SHIPPED | OUTPATIENT
Start: 2022-03-11 | End: 2022-03-21

## 2022-03-11 RX ADMIN — IPRATROPIUM BROMIDE AND ALBUTEROL SULFATE 3 ML: .5; 3 SOLUTION RESPIRATORY (INHALATION) at 11:03

## 2022-03-11 RX ADMIN — IPRATROPIUM BROMIDE AND ALBUTEROL SULFATE 3 ML: .5; 3 SOLUTION RESPIRATORY (INHALATION) at 12:03

## 2022-03-11 NOTE — ED PROVIDER NOTES
Encounter Date: 3/11/2022    SCRIBE #1 NOTE: I, Jessi De La Torre, am scribing for, and in the presence of,  Jennifer Ross MD. I have scribed the following portions of the note - Other sections scribed: HPI; ROS; PE.       History     Chief Complaint   Patient presents with    Shortness of Breath     And wheezing, dry cough, increasing x 1 week, denies fever     Samia Diaz is a 71 y.o. female with Hx of DM, HTN, HLD, and GERD who presents to the ED for chief complaint of wheezing and dry coughing onset 1 week ago. Patient reports chronic dyspnea at baseline. She has associated symptoms of postnasal drip and clear rhinorrhea. Patient has had similar symptoms when she had bronchitis 4-5 years ago. She denies fever, chills, chest pain, leg pain, leg swelling, nausea, or vomiting.    The history is provided by the patient. No  was used.     Review of patient's allergies indicates:   Allergen Reactions    Sulfa (sulfonamide antibiotics)      Past Medical History:   Diagnosis Date    Diabetes mellitus     GERD (gastroesophageal reflux disease)     Hyperlipidemia     Hypertension     Stroke      Past Surgical History:   Procedure Laterality Date    Abdominal Mesh Placement       History reviewed. No pertinent family history.  Social History     Tobacco Use    Smoking status: Never Smoker    Smokeless tobacco: Never Used   Substance Use Topics    Alcohol use: Not Currently    Drug use: Never     Review of Systems   Constitutional: Negative for chills and fever.   HENT: Positive for postnasal drip and rhinorrhea.    Respiratory: Positive for cough, shortness of breath (chronic) and wheezing.    Cardiovascular: Negative for chest pain and leg swelling.   Gastrointestinal: Negative for nausea and vomiting.   All other systems reviewed and are negative.      Physical Exam     Initial Vitals [03/11/22 1133]   BP Pulse Resp Temp SpO2   (!) 160/68 61 20 98.2 °F (36.8 °C) 98 %      MAP       --          Physical Exam    Nursing note and vitals reviewed.  Constitutional: Vital signs are normal. She appears well-developed.  Non-toxic appearance.   HENT:   Head: Normocephalic and atraumatic.   Eyes: Conjunctivae and lids are normal.   Neck: Trachea normal. Neck supple.   Normal range of motion.  Cardiovascular: Normal rate, regular rhythm, normal heart sounds, intact distal pulses and normal pulses. Exam reveals no gallop and no friction rub.    No murmur heard.  Lungs clear to auscultation B, no wheezes. Good air movement   Pulmonary/Chest: Breath sounds normal. No respiratory distress. She has no wheezes. She has no rhonchi. She has no rales. She exhibits no tenderness.   Abdominal: Abdomen is soft. Bowel sounds are normal. There is no abdominal tenderness.   Musculoskeletal:         General: Normal range of motion.      Cervical back: Normal range of motion and neck supple.      Right lower leg: No edema (pretibial).      Left lower leg: No edema (pretibial).     Lymphadenopathy:     She has no cervical adenopathy.   Neurological: She is alert. She has normal strength and normal reflexes. No cranial nerve deficit or sensory deficit. She displays a negative Romberg sign. Coordination and gait normal. GCS eye subscore is 4. GCS verbal subscore is 5. GCS motor subscore is 6.   Skin: Skin is warm and dry.   Psychiatric: She has a normal mood and affect. Her speech is normal and behavior is normal.         ED Course   Procedures  Labs Reviewed   POCT CMP - Abnormal; Notable for the following components:       Result Value    POC Chloride 110 (*)     POC Glucose 171 (*)     All other components within normal limits   POCT B-TYPE NATRIURETIC PEPTIDE (BNP) - Abnormal; Notable for the following components:    POC B-Type Natriuretic Peptide 155 (*)     All other components within normal limits   TROPONIN ISTAT   POCT CBC   SARS-COV-2 RDRP GENE    Narrative:     This test utilizes isothermal nucleic acid amplification    technology to detect the SARS-CoV-2 RdRp nucleic acid segment.   The analytical sensitivity (limit of detection) is 125 genome   equivalents/mL.   A POSITIVE result implies infection with the SARS-CoV-2 virus;   the patient is presumed to be contagious.     A NEGATIVE result means that SARS-CoV-2 nucleic acids are not   present above the limit of detection. A NEGATIVE result should be   treated as presumptive. It does not rule out the possibility of   COVID-19 and should not be the sole basis for treatment decisions.   If COVID-19 is strongly suspected based on clinical and exposure   history, re-testing using an alternate molecular assay should be   considered.   This test is only for use under the Food and Drug   Administration s Emergency Use Authorization (EUA).   Commercial kits are provided by [employer].   Performance characteristics of the EUA have been independently   verified by [facility] Department of   Pathology and Laboratory Medicine.   _________________________________________________________________   The authorized Fact Sheet for Healthcare Providers and the authorized Fact   Sheet for Patients of the ID NOW COVID-19 are available on the FDA   website:     https://www.fda.gov/media/149243/download  https://www.fda.gov/media/380621/download          POCT URINALYSIS W/O SCOPE   POCT INFLUENZA A/B MOLECULAR   POCT CMP   POCT TROPONIN   POCT B-TYPE NATRIURETIC PEPTIDE (BNP)   POCT RAPID INFLUENZA A/B                Imaging Results          X-Ray Chest 1 View (Final result)  Result time 03/11/22 12:17:18    Final result by Kenan Patten DO (03/11/22 12:17:18)                 Impression:      No acute abnormality.      Electronically signed by: Kenan Patten  Date:    03/11/2022  Time:    12:17             Narrative:    EXAMINATION:  XR CHEST 1 VIEW    CLINICAL HISTORY:  Cough, unspecified    TECHNIQUE:  Single frontal view of the chest was  performed.    COMPARISON:  01/08/2021.    FINDINGS:  The lungs are well expanded and clear. No focal opacities are seen. The pleural spaces are clear.    The cardiac silhouette is unremarkable.  There are atherosclerotic calcifications of the aortic arch.    The visualized osseous structures are intact.    A zipper and multiple buttons overlie the left upper quadrant the abdomen.  Several densities overlie the right upper quadrant as well, likely external, correlate clinically.                              X-Rays:   Independently Interpreted Readings:   Chest X-Ray: Normal heart size.  No infiltrates.  No acute abnormalities.     Medications   albuterol-ipratropium 2.5 mg-0.5 mg/3 mL nebulizer solution 3 mL (3 mLs Nebulization Given 3/11/22 1159)   albuterol-ipratropium 2.5 mg-0.5 mg/3 mL nebulizer solution 3 mL (3 mLs Nebulization Given 3/11/22 1253)     Medical Decision Making:   History:   Old Medical Records: I decided to obtain old medical records.  Independently Interpreted Test(s):   I have ordered and independently interpreted X-rays - see prior notes.  Clinical Tests:   Lab Tests: Ordered and Reviewed  Radiological Study: Ordered and Reviewed          Scribe Attestation:   Scribe #1: I performed the above scribed service and the documentation accurately describes the services I performed. I attest to the accuracy of the note.          12:22 PM patient says the breathing treatment helped a lot. She aslo asked if I could give her antibiotics.  I gave her script for a zpack, to fill if she was not feeling better after 24 hours of just albuterol      I, Jennifer Ross, personally performed the services described in this documentation. All medical record entries made by the scribe were at my direction and in my presence.  I have reviewed the chart and agree that the record reflects my personal performance and is accurate and complete.Jennifer Ross M.D. 11:50 AM03/11/2022  Clinical Impression:   Final  diagnoses:  [R05.9] Cough  [J40] Bronchitis (Primary)  [R07.89] Chest discomfort  [I10] Essential hypertension          ED Disposition Condition    Discharge Stable        ED Prescriptions     Medication Sig Dispense Start Date End Date Auth. Provider    albuterol (PROVENTIL/VENTOLIN HFA) 90 mcg/actuation inhaler Inhale 1-2 puffs into the lungs every 6 (six) hours as needed for Wheezing or Shortness of Breath. Rescue 8 g 3/11/2022 3/25/2022 Jennifer Ross MD    benzonatate (TESSALON) 100 MG capsule Take 2 capsules (200 mg total) by mouth 3 (three) times daily as needed for Cough. 20 capsule 3/11/2022 3/21/2022 Jennifer Ross MD        Follow-up Information     Follow up With Specialties Details Why Contact Info    [facility] CARDIOLOGY Cardiology Schedule an appointment as soon as possible for a visit in 1 week  [address] [facility] [address]  [phone]           Jennifer Ross MD  03/11/22 7485

## 2022-06-18 ENCOUNTER — HOSPITAL ENCOUNTER (OUTPATIENT)
Facility: HOSPITAL | Age: 72
Discharge: HOME | End: 2022-06-20
Attending: EMERGENCY MEDICINE | Admitting: HOSPITALIST
Payer: MEDICAID

## 2022-06-18 DIAGNOSIS — R07.9 CHEST PAIN: ICD-10-CM

## 2022-06-18 DIAGNOSIS — R10.9 ABDOMINAL PAIN, UNSPECIFIED ABDOMINAL LOCATION: ICD-10-CM

## 2022-06-18 DIAGNOSIS — R51.9 NONINTRACTABLE HEADACHE, UNSPECIFIED CHRONICITY PATTERN, UNSPECIFIED HEADACHE TYPE: ICD-10-CM

## 2022-06-18 DIAGNOSIS — N39.0 URINARY TRACT INFECTION WITHOUT HEMATURIA, SITE UNSPECIFIED: ICD-10-CM

## 2022-06-18 DIAGNOSIS — R06.09 DOE (DYSPNEA ON EXERTION): Primary | ICD-10-CM

## 2022-06-18 DIAGNOSIS — I10 UNCONTROLLED HYPERTENSION: ICD-10-CM

## 2022-06-18 PROBLEM — E11.9 DIABETES MELLITUS, TYPE 2: Status: ACTIVE | Noted: 2022-06-18

## 2022-06-18 PROBLEM — N18.30 CKD (CHRONIC KIDNEY DISEASE) STAGE 3, GFR 30-59 ML/MIN: Status: ACTIVE | Noted: 2022-06-18

## 2022-06-18 PROBLEM — Z86.73 HISTORY OF CVA (CEREBROVASCULAR ACCIDENT): Status: ACTIVE | Noted: 2022-06-18

## 2022-06-18 LAB
ALBUMIN SERPL-MCNC: 3.9 G/DL (ref 3.3–5.5)
ALBUMIN SERPL-MCNC: 4.1 G/DL (ref 3.3–5.5)
ALP SERPL-CCNC: 81 U/L (ref 42–141)
ALP SERPL-CCNC: 89 U/L (ref 42–141)
BILIRUB SERPL-MCNC: 1 MG/DL (ref 0.2–1.6)
BILIRUB SERPL-MCNC: 1 MG/DL (ref 0.2–1.6)
BILIRUBIN, POC UA: NEGATIVE
BLOOD, POC UA: ABNORMAL
BUN SERPL-MCNC: 15 MG/DL (ref 7–22)
CALCIUM SERPL-MCNC: 10.5 MG/DL (ref 8–10.3)
CHLORIDE SERPL-SCNC: 102 MMOL/L (ref 98–108)
CLARITY, POC UA: CLEAR
COLOR, POC UA: YELLOW
CREAT SERPL-MCNC: 1.3 MG/DL (ref 0.6–1.2)
CTP QC/QA: YES
GLUCOSE SERPL-MCNC: 156 MG/DL (ref 73–118)
GLUCOSE, POC UA: NEGATIVE
INFLUENZA A ANTIGEN, POC: NEGATIVE
INFLUENZA B ANTIGEN, POC: NEGATIVE
KETONES, POC UA: NEGATIVE
LEUKOCYTE EST, POC UA: ABNORMAL
NITRITE, POC UA: NEGATIVE
PH UR STRIP: 6 [PH]
POC ALT (SGPT): 23 U/L (ref 10–47)
POC ALT (SGPT): 24 U/L (ref 10–47)
POC AMYLASE: 63 U/L (ref 14–97)
POC AST (SGOT): 17 U/L (ref 11–38)
POC AST (SGOT): 21 U/L (ref 11–38)
POC CARDIAC TROPONIN I: 0 NG/ML
POC GGT: 18 U/L (ref 5–65)
POC TCO2: 23 MMOL/L (ref 18–33)
POCT GLUCOSE: 148 MG/DL (ref 70–110)
POCT GLUCOSE: 155 MG/DL (ref 70–110)
POCT GLUCOSE: 166 MG/DL (ref 70–110)
POTASSIUM BLD-SCNC: 4.9 MMOL/L (ref 3.6–5.1)
PROTEIN, POC UA: NEGATIVE
PROTEIN, POC: 8.3 G/DL (ref 6.4–8.1)
PROTEIN, POC: 8.4 G/DL (ref 6.4–8.1)
SAMPLE: NORMAL
SARS-COV-2 RDRP RESP QL NAA+PROBE: NEGATIVE
SODIUM BLD-SCNC: 141 MMOL/L (ref 128–145)
SPECIFIC GRAVITY, POC UA: 1.02
TROPONIN I SERPL DL<=0.01 NG/ML-MCNC: <0.006 NG/ML (ref 0–0.03)
UROBILINOGEN, POC UA: 0.2 E.U./DL

## 2022-06-18 PROCEDURE — 96375 TX/PRO/DX INJ NEW DRUG ADDON: CPT | Mod: ER

## 2022-06-18 PROCEDURE — 25000003 PHARM REV CODE 250: Mod: ER | Performed by: EMERGENCY MEDICINE

## 2022-06-18 PROCEDURE — 63600175 PHARM REV CODE 636 W HCPCS: Mod: ER | Performed by: EMERGENCY MEDICINE

## 2022-06-18 PROCEDURE — 93005 ELECTROCARDIOGRAM TRACING: CPT | Mod: ER

## 2022-06-18 PROCEDURE — 93010 ELECTROCARDIOGRAM REPORT: CPT | Mod: ,,, | Performed by: INTERNAL MEDICINE

## 2022-06-18 PROCEDURE — U0002 COVID-19 LAB TEST NON-CDC: HCPCS | Mod: ER | Performed by: EMERGENCY MEDICINE

## 2022-06-18 PROCEDURE — 84484 ASSAY OF TROPONIN QUANT: CPT | Performed by: PHYSICIAN ASSISTANT

## 2022-06-18 PROCEDURE — 93010 EKG 12-LEAD: ICD-10-PCS | Mod: ,,, | Performed by: INTERNAL MEDICINE

## 2022-06-18 PROCEDURE — 83036 HEMOGLOBIN GLYCOSYLATED A1C: CPT | Performed by: PHYSICIAN ASSISTANT

## 2022-06-18 PROCEDURE — 99285 EMERGENCY DEPT VISIT HI MDM: CPT | Mod: 25,ER

## 2022-06-18 PROCEDURE — G0378 HOSPITAL OBSERVATION PER HR: HCPCS

## 2022-06-18 PROCEDURE — 36415 COLL VENOUS BLD VENIPUNCTURE: CPT | Performed by: PHYSICIAN ASSISTANT

## 2022-06-18 PROCEDURE — 83690 ASSAY OF LIPASE: CPT | Performed by: PHYSICIAN ASSISTANT

## 2022-06-18 PROCEDURE — 80053 COMPREHEN METABOLIC PANEL: CPT | Mod: ER

## 2022-06-18 PROCEDURE — 81003 URINALYSIS AUTO W/O SCOPE: CPT | Mod: ER

## 2022-06-18 PROCEDURE — 85025 COMPLETE CBC W/AUTO DIFF WBC: CPT | Mod: ER

## 2022-06-18 PROCEDURE — 82040 ASSAY OF SERUM ALBUMIN: CPT | Mod: ER

## 2022-06-18 PROCEDURE — 84484 ASSAY OF TROPONIN QUANT: CPT | Mod: ER

## 2022-06-18 PROCEDURE — 96365 THER/PROPH/DIAG IV INF INIT: CPT | Mod: ER

## 2022-06-18 PROCEDURE — 82962 GLUCOSE BLOOD TEST: CPT | Mod: ER

## 2022-06-18 PROCEDURE — 87502 INFLUENZA DNA AMP PROBE: CPT | Mod: ER

## 2022-06-18 PROCEDURE — 25000003 PHARM REV CODE 250: Performed by: PHYSICIAN ASSISTANT

## 2022-06-18 RX ORDER — ATORVASTATIN CALCIUM 40 MG/1
80 TABLET, FILM COATED ORAL DAILY
Status: DISCONTINUED | OUTPATIENT
Start: 2022-06-19 | End: 2022-06-20 | Stop reason: HOSPADM

## 2022-06-18 RX ORDER — INSULIN ASPART 100 [IU]/ML
0-5 INJECTION, SOLUTION INTRAVENOUS; SUBCUTANEOUS
Status: DISCONTINUED | OUTPATIENT
Start: 2022-06-18 | End: 2022-06-20 | Stop reason: HOSPADM

## 2022-06-18 RX ORDER — HYDRALAZINE HYDROCHLORIDE 20 MG/ML
5 INJECTION INTRAMUSCULAR; INTRAVENOUS
Status: COMPLETED | OUTPATIENT
Start: 2022-06-18 | End: 2022-06-18

## 2022-06-18 RX ORDER — LIDOCAINE HYDROCHLORIDE 20 MG/ML
10 SOLUTION OROPHARYNGEAL ONCE
Status: COMPLETED | OUTPATIENT
Start: 2022-06-18 | End: 2022-06-18

## 2022-06-18 RX ORDER — CLONIDINE HYDROCHLORIDE 0.1 MG/1
0.1 TABLET ORAL 2 TIMES DAILY
Status: DISCONTINUED | OUTPATIENT
Start: 2022-06-18 | End: 2022-06-19

## 2022-06-18 RX ORDER — SODIUM CHLORIDE 0.9 % (FLUSH) 0.9 %
10 SYRINGE (ML) INJECTION EVERY 8 HOURS
Status: DISCONTINUED | OUTPATIENT
Start: 2022-06-18 | End: 2022-06-18

## 2022-06-18 RX ORDER — LOSARTAN POTASSIUM 25 MG/1
100 TABLET ORAL DAILY
Status: DISCONTINUED | OUTPATIENT
Start: 2022-06-19 | End: 2022-06-20 | Stop reason: HOSPADM

## 2022-06-18 RX ORDER — LANOLIN ALCOHOL/MO/W.PET/CERES
800 CREAM (GRAM) TOPICAL
Status: DISCONTINUED | OUTPATIENT
Start: 2022-06-18 | End: 2022-06-20 | Stop reason: HOSPADM

## 2022-06-18 RX ORDER — LOSARTAN POTASSIUM 100 MG/1
100 TABLET ORAL DAILY
COMMUNITY
End: 2022-06-20

## 2022-06-18 RX ORDER — NALOXONE HCL 0.4 MG/ML
0.02 VIAL (ML) INJECTION
Status: DISCONTINUED | OUTPATIENT
Start: 2022-06-18 | End: 2022-06-20 | Stop reason: HOSPADM

## 2022-06-18 RX ORDER — NAPROXEN SODIUM 220 MG/1
81 TABLET, FILM COATED ORAL DAILY
Status: DISCONTINUED | OUTPATIENT
Start: 2022-06-19 | End: 2022-06-20 | Stop reason: HOSPADM

## 2022-06-18 RX ORDER — ATENOLOL 50 MG/1
50 TABLET ORAL DAILY
COMMUNITY
End: 2022-06-20

## 2022-06-18 RX ORDER — PROCHLORPERAZINE EDISYLATE 5 MG/ML
5 INJECTION INTRAMUSCULAR; INTRAVENOUS
Status: COMPLETED | OUTPATIENT
Start: 2022-06-18 | End: 2022-06-18

## 2022-06-18 RX ORDER — AMOXICILLIN 250 MG
1 CAPSULE ORAL DAILY PRN
Status: DISCONTINUED | OUTPATIENT
Start: 2022-06-18 | End: 2022-06-20 | Stop reason: HOSPADM

## 2022-06-18 RX ORDER — GLUCAGON 1 MG
1 KIT INJECTION
Status: DISCONTINUED | OUTPATIENT
Start: 2022-06-18 | End: 2022-06-20 | Stop reason: HOSPADM

## 2022-06-18 RX ORDER — ACETAMINOPHEN 325 MG/1
650 TABLET ORAL EVERY 4 HOURS PRN
Status: DISCONTINUED | OUTPATIENT
Start: 2022-06-18 | End: 2022-06-20 | Stop reason: HOSPADM

## 2022-06-18 RX ORDER — GABAPENTIN 100 MG/1
100 CAPSULE ORAL DAILY
COMMUNITY

## 2022-06-18 RX ORDER — CLONIDINE HYDROCHLORIDE 0.1 MG/1
0.1 TABLET ORAL 2 TIMES DAILY
COMMUNITY

## 2022-06-18 RX ORDER — TALC
6 POWDER (GRAM) TOPICAL NIGHTLY PRN
Status: DISCONTINUED | OUTPATIENT
Start: 2022-06-18 | End: 2022-06-20 | Stop reason: HOSPADM

## 2022-06-18 RX ORDER — ASPIRIN 325 MG
325 TABLET ORAL
Status: COMPLETED | OUTPATIENT
Start: 2022-06-18 | End: 2022-06-18

## 2022-06-18 RX ORDER — SODIUM CHLORIDE 0.9 % (FLUSH) 0.9 %
10 SYRINGE (ML) INJECTION
Status: DISCONTINUED | OUTPATIENT
Start: 2022-06-18 | End: 2022-06-20 | Stop reason: HOSPADM

## 2022-06-18 RX ORDER — IBUPROFEN 200 MG
16 TABLET ORAL
Status: DISCONTINUED | OUTPATIENT
Start: 2022-06-18 | End: 2022-06-20 | Stop reason: HOSPADM

## 2022-06-18 RX ORDER — ATENOLOL 25 MG/1
50 TABLET ORAL DAILY
Status: DISCONTINUED | OUTPATIENT
Start: 2022-06-19 | End: 2022-06-19

## 2022-06-18 RX ORDER — IBUPROFEN 200 MG
24 TABLET ORAL
Status: DISCONTINUED | OUTPATIENT
Start: 2022-06-18 | End: 2022-06-20 | Stop reason: HOSPADM

## 2022-06-18 RX ORDER — FAMOTIDINE 10 MG/ML
20 INJECTION INTRAVENOUS
Status: COMPLETED | OUTPATIENT
Start: 2022-06-18 | End: 2022-06-18

## 2022-06-18 RX ORDER — MAG HYDROX/ALUMINUM HYD/SIMETH 200-200-20
30 SUSPENSION, ORAL (FINAL DOSE FORM) ORAL ONCE
Status: COMPLETED | OUTPATIENT
Start: 2022-06-18 | End: 2022-06-18

## 2022-06-18 RX ADMIN — FAMOTIDINE 20 MG: 10 INJECTION INTRAVENOUS at 05:06

## 2022-06-18 RX ADMIN — LIDOCAINE HYDROCHLORIDE 10 ML: 20 SOLUTION ORAL at 09:06

## 2022-06-18 RX ADMIN — Medication 6 MG: at 11:06

## 2022-06-18 RX ADMIN — CLONIDINE HYDROCHLORIDE 0.1 MG: 0.1 TABLET ORAL at 09:06

## 2022-06-18 RX ADMIN — ALUMINUM HYDROXIDE, MAGNESIUM HYDROXIDE, AND SIMETHICONE 30 ML: 200; 200; 20 SUSPENSION ORAL at 09:06

## 2022-06-18 RX ADMIN — PROCHLORPERAZINE EDISYLATE 5 MG: 5 INJECTION INTRAMUSCULAR; INTRAVENOUS at 05:06

## 2022-06-18 RX ADMIN — CEFTRIAXONE 1 G: 1 INJECTION, SOLUTION INTRAVENOUS at 05:06

## 2022-06-18 RX ADMIN — HYDRALAZINE HYDROCHLORIDE 5 MG: 20 INJECTION, SOLUTION INTRAMUSCULAR; INTRAVENOUS at 05:06

## 2022-06-18 RX ADMIN — ASPIRIN 325 MG ORAL TABLET 325 MG: 325 PILL ORAL at 05:06

## 2022-06-18 RX ADMIN — ACETAMINOPHEN 650 MG: 325 TABLET ORAL at 11:06

## 2022-06-18 NOTE — ED PROVIDER NOTES
Encounter Date: 6/18/2022    SCRIBE #1 NOTE: I, Dez Covarrubias, am scribing for, and in the presence of,  Dr. Joshi. I have scribed the following portions of the note - Other sections scribed: HPI, ROS, PE.       History     Chief Complaint   Patient presents with    Abdominal Pain     Yesterday started feeling bad with abd pain, nausea, able to urinate but not able to pass stool. Also states having a headache. States having pressure on chest last night.     Samia Diaz is a 72 y.o. female with a PMHx of DM, HTN, HLD, GERD, and stroke who presents to the ED for evaluation of acute generalized abdominal pain with associated nausea onset yesterday along with light-headedness, dizziness, and headache. Patient also reports intermittent pressure type chest pain that began last night at rest. She endorses SOB with exertion. Patient notes that her glucoses level was elevated yesterday and her BP has been elevated.  Her PCP is working to adjust her BP medication.  Patient did contract COVID the beginning of this month but tested negative about 3 days ago. She reports her last bowel movement was yesterday but that she still feels constipated and gassy. She denies leg swelling, dysuria, hematuria, or all other complaints at the present time. No smoke or alcohol use. No known positive sick contact exposures. Patient endorses compliance with her daily medications.    The history is provided by the patient. No  was used.     Review of patient's allergies indicates:   Allergen Reactions    Sulfa (sulfonamide antibiotics)      Past Medical History:   Diagnosis Date    Diabetes mellitus     GERD (gastroesophageal reflux disease)     Hyperlipidemia     Hypertension     Stroke      Past Surgical History:   Procedure Laterality Date    Abdominal Mesh Placement       History reviewed. No pertinent family history.  Social History     Tobacco Use    Smoking status: Never Smoker    Smokeless tobacco: Never Used    Substance Use Topics    Alcohol use: Not Currently    Drug use: Never     Review of Systems   Constitutional: Negative for activity change, appetite change, chills and fever.   HENT: Negative for congestion, rhinorrhea, sneezing and sore throat.    Respiratory: Positive for shortness of breath. Negative for cough, choking and wheezing.    Cardiovascular: Positive for chest pain. Negative for palpitations and leg swelling.   Gastrointestinal: Positive for abdominal pain, constipation and nausea. Negative for diarrhea and vomiting.   Genitourinary: Negative for dysuria and hematuria.   Neurological: Positive for dizziness, light-headedness and headaches. Negative for syncope.   All other systems reviewed and are negative.      Physical Exam     Initial Vitals [06/18/22 1357]   BP Pulse Resp Temp SpO2   (!) 185/79 62 16 98.6 °F (37 °C) 96 %      MAP       --         Physical Exam    Nursing note and vitals reviewed.  Constitutional: Vital signs are normal. She appears well-developed and well-nourished. She is cooperative. She does not appear ill. No distress.   HENT:   Head: Normocephalic and atraumatic.   Mouth/Throat: Uvula is midline and mucous membranes are normal.   Eyes: Conjunctivae and lids are normal.   Neck: Neck supple.   Normal range of motion.  Cardiovascular: Normal rate, regular rhythm, S1 normal, S2 normal and normal heart sounds.   No murmur heard.  Pulmonary/Chest: Effort normal and breath sounds normal. No respiratory distress. She has no wheezes. She has no rhonchi. She has no rales.   Abdominal: Abdomen is soft. Bowel sounds are normal. She exhibits no distension. There is no abdominal tenderness.   Musculoskeletal:         General: Normal range of motion.      Cervical back: Normal range of motion and neck supple.      Right lower leg: No edema.      Left lower leg: No edema.     Neurological: She is alert and oriented to person, place, and time.   Skin: Skin is warm, dry and intact.    Psychiatric: She has a normal mood and affect. Her speech is normal and behavior is normal.         ED Course   Procedures  Labs Reviewed   POCT URINALYSIS W/O SCOPE - Abnormal; Notable for the following components:       Result Value    Blood, UA Trace-intact (*)     Leukocytes, UA 1+ (*)     All other components within normal limits   POCT GLUCOSE - Abnormal; Notable for the following components:    POCT Glucose 166 (*)     All other components within normal limits   POCT LIVER PANEL - Abnormal; Notable for the following components:    Protein, POC 8.4 (*)     All other components within normal limits   POCT CMP - Abnormal; Notable for the following components:    Calcium, POC 10.5 (*)     POC Creatinine 1.3 (*)     POC Glucose 156 (*)     Protein, POC 8.3 (*)     All other components within normal limits   POCT GLUCOSE - Abnormal; Notable for the following components:    POCT Glucose 148 (*)     All other components within normal limits   TROPONIN ISTAT   TROPONIN I   POCT CBC   POCT URINALYSIS W/O SCOPE   SARS-COV-2 RDRP GENE    Narrative:     This test utilizes isothermal nucleic acid amplification   technology to detect the SARS-CoV-2 RdRp nucleic acid segment.   The analytical sensitivity (limit of detection) is 125 genome   equivalents/mL.   A POSITIVE result implies infection with the SARS-CoV-2 virus;   the patient is presumed to be contagious.     A NEGATIVE result means that SARS-CoV-2 nucleic acids are not   present above the limit of detection. A NEGATIVE result should be   treated as presumptive. It does not rule out the possibility of   COVID-19 and should not be the sole basis for treatment decisions.   If COVID-19 is strongly suspected based on clinical and exposure   history, re-testing using an alternate molecular assay should be   considered.   This test is only for use under the Food and Drug   Administration s Emergency Use Authorization (EUA).   Commercial kits are provided by [employer].   Performance characteristics of the EUA have been independently   verified by [facility] Department of   Pathology and Laboratory Medicine.   _________________________________________________________________   The authorized Fact Sheet for Healthcare Providers and the authorized Fact   Sheet for Patients of the ID NOW COVID-19 are available on the FDA   website:     https://www.fda.gov/media/868744/download  https://www.fda.gov/media/476432/download          POCT INFLUENZA A/B MOLECULAR   POCT CMP   POCT TROPONIN   POCT LIVER PANEL   POCT RAPID INFLUENZA A/B   POCT GLUCOSE MONITORING CONTINUOUS         EKG Readings: (Independently Interpreted)   Initial Reading: No STEMI. Rhythm: Normal Sinus Rhythm. Heart Rate: 63. Ectopy: No Ectopy. Conduction: Normal. T Waves: Normal. Clinical Impression: Normal Sinus Rhythm Other Impression: T wave flattening       Imaging Results          CT Head Without Contrast (Final result)  Result time 06/18/22 17:24:04    Final result by Papito Odom MD (06/18/22 17:24:04)                 Impression:      No acute large vascular territory infarct or intracranial hemorrhage identified.  If persistent neurologic deficit, MRI brain can be obtained.    Remote left occipital cortical infarct.    Left thalamic small remote lacunar infarct.      Electronically signed by: Papito Odom MD  Date:    06/18/2022  Time:    17:24             Narrative:    EXAMINATION:  CT HEAD WITHOUT CONTRAST    CLINICAL HISTORY:  Headache, new or worsening (Age >= 50y);    TECHNIQUE:  Low dose axial CT images obtained throughout the head without intravenous contrast. Sagittal and coronal reconstructions were performed.    COMPARISON:  Head CT most recent 05/10/2020    FINDINGS:  Intracranial compartment:    Age-related generalized cerebral volume loss.  The ventricles are midline and stable in overall size and configuration without distortion by mass effect or acute hydrocephalus noting  ex vacuo dilatation of the posterior horn on the left.  Stable encephalomalacia of the left occipital lobe suggesting remote infarct.  Left thalamic remote lacunar type infarct stable.  Grossly stable distribution of mild patchy hypoattenuation of the subcortical and periventricular white matter consistent with chronic microvascular ischemic change.  Bilateral basal ganglia calcifications noted.  No extra-axial blood or fluid collections.  Skull base atherosclerotic vascular calcifications noted.    No definite new focal areas of abnormal parenchymal attenuation.  No parenchymal mass, hemorrhage, edema or acute major vascular distribution infarct.    Skull/extracranial contents (limited evaluation): Hyperostosis frontalis interna.  No fracture. Mastoid air cells and paranasal sinuses are essentially clear.  Imaged portions of the orbits are within normal limits.                               CT Abdomen Pelvis  Without Contrast (Final result)  Result time 06/18/22 17:29:54    Final result by April Wise MD (06/18/22 17:29:54)                 Impression:      1. No acute intra-abdominal abnormalities identified.  2. Small nonobstructing left renal stone.  3. Additional findings as detailed above.      Electronically signed by: April Wise MD  Date:    06/18/2022  Time:    17:29             Narrative:    EXAMINATION:  CT ABDOMEN PELVIS WITHOUT CONTRAST    CLINICAL HISTORY:  Nausea/vomiting;Abdominal pain, acute, nonlocalized;    TECHNIQUE:  Low dose axial images, sagittal and coronal reformations were obtained from the lung bases to the pubic symphysis.  Oral contrast was not administered.    COMPARISON:  None    FINDINGS:  The visualized portion of the heart is unremarkable.  The lung bases are clear.    No significant hepatic abnormalities are identified.  There is no intra-or extrahepatic biliary ductal dilatation.  The gallbladder is unremarkable.  The stomach, pancreas, spleen, and adrenal glands are  unremarkable.    Small nonobstructing left renal stone is seen.  Kidneys show no hydronephrosis.  Suspected phleboliths are seen near the distal ureteral courses.  Urinary bladder is nondistended.  Uterus is unremarkable.    Appendix is visualized and is unremarkable.  The visualized loops of small and large bowel show no evidence of obstruction or inflammation.  Few scattered colonic diverticula are seen.  Colonic interposition is noted anterior to the liver.  No free air or free fluid.    Aorta tapers normally with extensive atherosclerosis.    No acute osseous abnormality identified.  Degenerative changes seen within the lumbar spine with intervertebral disc space narrowing most pronounced at the L2-3 and L5-S1 levels.  There is mild grade 1 anterolisthesis of L5 on S1.  Subcutaneous soft tissue show no significant abnormalities.                               X-Ray Chest AP Portable (Final result)  Result time 06/18/22 15:06:19    Final result by Marta Wilson MD (06/18/22 15:06:19)                 Impression:      No acute abnormality.      Electronically signed by: Marta Wilson MD  Date:    06/18/2022  Time:    15:06             Narrative:    EXAMINATION:  XR CHEST AP PORTABLE    CLINICAL HISTORY:  Chest Pain;    TECHNIQUE:  Single frontal view of the chest was performed.    COMPARISON:  03/11/2022    FINDINGS:  The lungs are clear with normal appearance of pulmonary vasculature. No pleural effusion. No evident pneumothorax.    The cardiac silhouette is normal in size. The hilar and mediastinal contours are unremarkable.Calcifications of the aortic knob.    Bones are intact.                                 Medications   melatonin tablet 6 mg (has no administration in time range)   senna-docusate 8.6-50 mg per tablet 1 tablet (has no administration in time range)   acetaminophen tablet 650 mg (has no administration in time range)   naloxone 0.4 mg/mL injection 0.02 mg (has no administration in time range)    magnesium oxide tablet 800 mg (has no administration in time range)   magnesium oxide tablet 800 mg (has no administration in time range)   glucose chewable tablet 16 g (has no administration in time range)   glucose chewable tablet 24 g (has no administration in time range)   glucagon (human recombinant) injection 1 mg (has no administration in time range)   sodium chloride 0.9% flush 10 mL (has no administration in time range)   insulin aspart U-100 pen 0-5 Units (has no administration in time range)   aspirin tablet 325 mg (325 mg Oral Given 6/18/22 1750)   famotidine (PF) injection 20 mg (20 mg Intravenous Given 6/18/22 1745)   hydrALAZINE injection 5 mg (5 mg Intravenous Given 6/18/22 1745)   cefTRIAXone (ROCEPHIN) 1 g/50 mL D5W IVPB (0 g Intravenous Stopped 6/18/22 1825)   prochlorperazine injection Soln 5 mg (5 mg Intravenous Given 6/18/22 1746)     Medical Decision Making:   History:   I obtained history from: someone other than patient.       <> Summary of History: daughter  Old Medical Records: I decided to obtain old medical records.  Independently Interpreted Test(s):   I have ordered and independently interpreted X-rays - see prior notes.  I have ordered and independently interpreted EKG Reading(s) - see prior notes  Clinical Tests:   Lab Tests: Ordered and Reviewed  Radiological Study: Ordered and Reviewed  Medical Tests: Ordered and Reviewed  ED Management:  Chest pain - EKG with no acute ischemic changes and troponin is normal.  Heart score is 5 - will admit for formal rule out    Abd pain - has UTI but pain is in upper abd - normal LFTs, CT with no acute findings to explain pain    CT head requested by family with no acute findings.  BP poorly controlled - given hydralazine.  Admission was discussed with Phil Ash.  Other:   I have discussed this case with another health care provider.          Scribe Attestation:   Scribe #1: I performed the above scribed service and the documentation  accurately describes the services I performed. I attest to the accuracy of the note.               I, Dr. Miguelina Joshi, personally performed the services described in this documentation.   All medical record entries made by the scribe were at my direction and in my presence.   I have reviewed the chart and agree that the record is accurate and complete.   Mgiuelina Joshi MD.  3:42 PM 06/18/2022     Clinical Impression:   Final diagnoses:  [R07.9] Chest pain  [R06.00] DONATO (dyspnea on exertion) (Primary)  [R10.9] Abdominal pain, unspecified abdominal location  [N39.0] Urinary tract infection without hematuria, site unspecified  [R51.9] Nonintractable headache, unspecified chronicity pattern, unspecified headache type  [I10] Uncontrolled hypertension          ED Disposition Condition    Observation               Miguelina Joshi MD  06/18/22 6191

## 2022-06-19 PROBLEM — I16.0 HYPERTENSIVE URGENCY: Status: ACTIVE | Noted: 2021-01-09

## 2022-06-19 PROBLEM — R07.9 CHEST PAIN: Status: RESOLVED | Noted: 2021-01-09 | Resolved: 2022-06-19

## 2022-06-19 PROBLEM — K21.9 GERD (GASTROESOPHAGEAL REFLUX DISEASE): Status: ACTIVE | Noted: 2022-06-19

## 2022-06-19 LAB
ALBUMIN SERPL BCP-MCNC: 3.6 G/DL (ref 3.5–5.2)
ALP SERPL-CCNC: 72 U/L (ref 55–135)
ALT SERPL W/O P-5'-P-CCNC: 20 U/L (ref 10–44)
ANION GAP SERPL CALC-SCNC: 11 MMOL/L (ref 8–16)
AST SERPL-CCNC: 13 U/L (ref 10–40)
AV INDEX (PROSTH): 0.71
AV MEAN GRADIENT: 6 MMHG
AV PEAK GRADIENT: 10 MMHG
AV VALVE AREA: 1.22 CM2
AV VELOCITY RATIO: 0.71
BASOPHILS # BLD AUTO: 0.07 K/UL (ref 0–0.2)
BASOPHILS NFR BLD: 0.6 % (ref 0–1.9)
BILIRUB SERPL-MCNC: 0.6 MG/DL (ref 0.1–1)
BSA FOR ECHO PROCEDURE: 2.03 M2
BUN SERPL-MCNC: 20 MG/DL (ref 8–23)
CALCIUM SERPL-MCNC: 10 MG/DL (ref 8.7–10.5)
CHLORIDE SERPL-SCNC: 108 MMOL/L (ref 95–110)
CO2 SERPL-SCNC: 19 MMOL/L (ref 23–29)
CREAT SERPL-MCNC: 1.3 MG/DL (ref 0.5–1.4)
CV ECHO LV RWT: 0.39 CM
DIFFERENTIAL METHOD: ABNORMAL
DOP CALC AO PEAK VEL: 1.56 M/S
DOP CALC AO VTI: 33.45 CM
DOP CALC LVOT AREA: 1.7 CM2
DOP CALC LVOT DIAMETER: 1.48 CM
DOP CALC LVOT PEAK VEL: 1.1 M/S
DOP CALC LVOT STROKE VOLUME: 40.73 CM3
DOP CALCLVOT PEAK VEL VTI: 23.69 CM
E WAVE DECELERATION TIME: 384.93 MSEC
E/A RATIO: 1.01
E/E' RATIO: 16 M/S
ECHO LV POSTERIOR WALL: 1.01 CM (ref 0.6–1.1)
EJECTION FRACTION: 70 %
EOSINOPHIL # BLD AUTO: 0.4 K/UL (ref 0–0.5)
EOSINOPHIL NFR BLD: 3.3 % (ref 0–8)
ERYTHROCYTE [DISTWIDTH] IN BLOOD BY AUTOMATED COUNT: 13.1 % (ref 11.5–14.5)
EST. GFR  (AFRICAN AMERICAN): 47 ML/MIN/1.73 M^2
EST. GFR  (NON AFRICAN AMERICAN): 41 ML/MIN/1.73 M^2
ESTIMATED AVG GLUCOSE: 171 MG/DL (ref 68–131)
FRACTIONAL SHORTENING: 36 % (ref 28–44)
GLUCOSE SERPL-MCNC: 115 MG/DL (ref 70–110)
HBA1C MFR BLD: 7.6 % (ref 4–5.6)
HCT VFR BLD AUTO: 37 % (ref 37–48.5)
HGB BLD-MCNC: 11.7 G/DL (ref 12–16)
IMM GRANULOCYTES # BLD AUTO: 0.04 K/UL (ref 0–0.04)
IMM GRANULOCYTES NFR BLD AUTO: 0.4 % (ref 0–0.5)
INTERVENTRICULAR SEPTUM: 1.48 CM (ref 0.6–1.1)
IVRT: 144.62 MSEC
LA MAJOR: 4.72 CM
LA MINOR: 5.28 CM
LA WIDTH: 3.95 CM
LEFT ATRIUM SIZE: 4.24 CM
LEFT ATRIUM VOLUME INDEX: 36.6 ML/M2
LEFT ATRIUM VOLUME: 70.96 CM3
LEFT INTERNAL DIMENSION IN SYSTOLE: 3.31 CM (ref 2.1–4)
LEFT VENTRICLE DIASTOLIC VOLUME INDEX: 65.87 ML/M2
LEFT VENTRICLE DIASTOLIC VOLUME: 127.79 ML
LEFT VENTRICLE MASS INDEX: 134 G/M2
LEFT VENTRICLE SYSTOLIC VOLUME INDEX: 22.9 ML/M2
LEFT VENTRICLE SYSTOLIC VOLUME: 44.42 ML
LEFT VENTRICULAR INTERNAL DIMENSION IN DIASTOLE: 5.17 CM (ref 3.5–6)
LEFT VENTRICULAR MASS: 259.57 G
LIPASE SERPL-CCNC: 42 U/L (ref 4–60)
LV LATERAL E/E' RATIO: 17.6 M/S
LV SEPTAL E/E' RATIO: 14.67 M/S
LYMPHOCYTES # BLD AUTO: 4.1 K/UL (ref 1–4.8)
LYMPHOCYTES NFR BLD: 36.6 % (ref 18–48)
MCH RBC QN AUTO: 27.6 PG (ref 27–31)
MCHC RBC AUTO-ENTMCNC: 31.6 G/DL (ref 32–36)
MCV RBC AUTO: 87 FL (ref 82–98)
MONOCYTES # BLD AUTO: 0.7 K/UL (ref 0.3–1)
MONOCYTES NFR BLD: 6 % (ref 4–15)
MV PEAK A VEL: 0.87 M/S
MV PEAK E VEL: 0.88 M/S
MV STENOSIS PRESSURE HALF TIME: 111.63 MS
MV VALVE AREA P 1/2 METHOD: 1.97 CM2
NEUTROPHILS # BLD AUTO: 6 K/UL (ref 1.8–7.7)
NEUTROPHILS NFR BLD: 53.1 % (ref 38–73)
NRBC BLD-RTO: 0 /100 WBC
PISA TR MAX VEL: 2.18 M/S
PLATELET # BLD AUTO: 274 K/UL (ref 150–450)
PMV BLD AUTO: 10.1 FL (ref 9.2–12.9)
POCT GLUCOSE: 145 MG/DL (ref 70–110)
POCT GLUCOSE: 149 MG/DL (ref 70–110)
POCT GLUCOSE: 176 MG/DL (ref 70–110)
POTASSIUM SERPL-SCNC: 4.4 MMOL/L (ref 3.5–5.1)
PROT SERPL-MCNC: 7.4 G/DL (ref 6–8.4)
PV PEAK VELOCITY: 1.22 CM/S
RA MAJOR: 4.52 CM
RA PRESSURE: 15 MMHG
RA WIDTH: 3.11 CM
RBC # BLD AUTO: 4.24 M/UL (ref 4–5.4)
RIGHT VENTRICULAR END-DIASTOLIC DIMENSION: 2.94 CM
SINUS: 2.8 CM
SODIUM SERPL-SCNC: 138 MMOL/L (ref 136–145)
STJ: 1.88 CM
TDI LATERAL: 0.05 M/S
TDI SEPTAL: 0.06 M/S
TDI: 0.06 M/S
TR MAX PG: 19 MMHG
TRICUSPID ANNULAR PLANE SYSTOLIC EXCURSION: 1.33 CM
TROPONIN I SERPL DL<=0.01 NG/ML-MCNC: <0.006 NG/ML (ref 0–0.03)
TV REST PULMONARY ARTERY PRESSURE: 34 MMHG
WBC # BLD AUTO: 11.3 K/UL (ref 3.9–12.7)

## 2022-06-19 PROCEDURE — 80053 COMPREHEN METABOLIC PANEL: CPT | Performed by: PHYSICIAN ASSISTANT

## 2022-06-19 PROCEDURE — 25000003 PHARM REV CODE 250: Performed by: NURSE PRACTITIONER

## 2022-06-19 PROCEDURE — 85025 COMPLETE CBC W/AUTO DIFF WBC: CPT | Performed by: PHYSICIAN ASSISTANT

## 2022-06-19 PROCEDURE — 25000003 PHARM REV CODE 250: Performed by: PHYSICIAN ASSISTANT

## 2022-06-19 PROCEDURE — 25000003 PHARM REV CODE 250: Performed by: INTERNAL MEDICINE

## 2022-06-19 PROCEDURE — G0378 HOSPITAL OBSERVATION PER HR: HCPCS

## 2022-06-19 PROCEDURE — 84484 ASSAY OF TROPONIN QUANT: CPT | Performed by: PHYSICIAN ASSISTANT

## 2022-06-19 PROCEDURE — 36415 COLL VENOUS BLD VENIPUNCTURE: CPT | Performed by: PHYSICIAN ASSISTANT

## 2022-06-19 RX ORDER — METOPROLOL SUCCINATE 25 MG/1
25 TABLET, EXTENDED RELEASE ORAL DAILY
Status: DISCONTINUED | OUTPATIENT
Start: 2022-06-20 | End: 2022-06-20 | Stop reason: HOSPADM

## 2022-06-19 RX ORDER — FAMOTIDINE 20 MG/1
20 TABLET, FILM COATED ORAL 2 TIMES DAILY
Status: DISCONTINUED | OUTPATIENT
Start: 2022-06-19 | End: 2022-06-20 | Stop reason: HOSPADM

## 2022-06-19 RX ORDER — POLYETHYLENE GLYCOL 3350 17 G/17G
17 POWDER, FOR SOLUTION ORAL DAILY
Qty: 30 PACKET | Refills: 0 | Status: SHIPPED | OUTPATIENT
Start: 2022-06-19

## 2022-06-19 RX ORDER — CLONIDINE HYDROCHLORIDE 0.1 MG/1
0.1 TABLET ORAL EVERY 8 HOURS PRN
Status: DISCONTINUED | OUTPATIENT
Start: 2022-06-19 | End: 2022-06-20 | Stop reason: HOSPADM

## 2022-06-19 RX ORDER — HYDRALAZINE HYDROCHLORIDE 25 MG/1
25 TABLET, FILM COATED ORAL EVERY 8 HOURS
Status: DISCONTINUED | OUTPATIENT
Start: 2022-06-19 | End: 2022-06-20 | Stop reason: HOSPADM

## 2022-06-19 RX ORDER — AMOXICILLIN 250 MG
1 CAPSULE ORAL DAILY PRN
Qty: 20 TABLET | Refills: 0 | Status: SHIPPED | OUTPATIENT
Start: 2022-06-19 | End: 2022-06-20 | Stop reason: HOSPADM

## 2022-06-19 RX ORDER — HYDROCHLOROTHIAZIDE 25 MG/1
25 TABLET ORAL DAILY
Status: DISCONTINUED | OUTPATIENT
Start: 2022-06-19 | End: 2022-06-20 | Stop reason: HOSPADM

## 2022-06-19 RX ORDER — PANTOPRAZOLE SODIUM 40 MG/1
40 TABLET, DELAYED RELEASE ORAL DAILY
Qty: 14 TABLET | Refills: 0 | Status: SHIPPED | OUTPATIENT
Start: 2022-06-19 | End: 2022-07-03

## 2022-06-19 RX ORDER — MAG HYDROX/ALUMINUM HYD/SIMETH 200-200-20
30 SUSPENSION, ORAL (FINAL DOSE FORM) ORAL ONCE
Status: COMPLETED | OUTPATIENT
Start: 2022-06-19 | End: 2022-06-19

## 2022-06-19 RX ORDER — LIDOCAINE HYDROCHLORIDE 20 MG/ML
10 SOLUTION OROPHARYNGEAL ONCE
Status: COMPLETED | OUTPATIENT
Start: 2022-06-19 | End: 2022-06-19

## 2022-06-19 RX ADMIN — FAMOTIDINE 20 MG: 20 TABLET ORAL at 04:06

## 2022-06-19 RX ADMIN — ACETAMINOPHEN 650 MG: 325 TABLET ORAL at 09:06

## 2022-06-19 RX ADMIN — ATORVASTATIN CALCIUM 80 MG: 40 TABLET, FILM COATED ORAL at 08:06

## 2022-06-19 RX ADMIN — ALUMINUM HYDROXIDE, MAGNESIUM HYDROXIDE, AND SIMETHICONE 30 ML: 200; 200; 20 SUSPENSION ORAL at 06:06

## 2022-06-19 RX ADMIN — Medication 6 MG: at 11:06

## 2022-06-19 RX ADMIN — HYDRALAZINE HYDROCHLORIDE 25 MG: 25 TABLET, FILM COATED ORAL at 04:06

## 2022-06-19 RX ADMIN — CLONIDINE HYDROCHLORIDE 0.1 MG: 0.1 TABLET ORAL at 08:06

## 2022-06-19 RX ADMIN — ATENOLOL 50 MG: 25 TABLET ORAL at 11:06

## 2022-06-19 RX ADMIN — LOSARTAN POTASSIUM 100 MG: 25 TABLET, FILM COATED ORAL at 08:06

## 2022-06-19 RX ADMIN — ASPIRIN 81 MG CHEWABLE TABLET 81 MG: 81 TABLET CHEWABLE at 08:06

## 2022-06-19 RX ADMIN — HYDROCHLOROTHIAZIDE 25 MG: 25 TABLET ORAL at 06:06

## 2022-06-19 RX ADMIN — HYDRALAZINE HYDROCHLORIDE 25 MG: 25 TABLET, FILM COATED ORAL at 09:06

## 2022-06-19 RX ADMIN — CLONIDINE HYDROCHLORIDE 0.1 MG: 0.1 TABLET ORAL at 06:06

## 2022-06-19 RX ADMIN — LIDOCAINE HYDROCHLORIDE 10 ML: 20 SOLUTION ORAL at 06:06

## 2022-06-19 NOTE — PLAN OF CARE
[facility] - Telemetry  Discharge Final Note    Primary Care Provider: Primary Doctor No    Expected Discharge Date: 6/19/2022    Final Discharge Note (most recent)     Final Note - 06/19/22 1309        Final Note    Assessment Type Final Discharge Note     Anticipated Discharge Disposition Home or Self Care     What phone number can be called within the next 1-3 days to see how you are doing after discharge? --   [phone]    Hospital Resources/Appts/Education Provided Appointments scheduled by Navigator/Coordinator;Provided education on problems/symptoms using teachback;Appointments scheduled and added to AVS;Provided patient/caregiver with written discharge plan information        Post-Acute Status    Post-Acute Authorization Other   Home; follow-up    Coverage [employer]     Other Status No Post-Acute Service Needs     Discharge Delays None known at this time                 Important Message from Medicare             Contact Info     [facility]    [address] [place] [address]   Phone: [phone]       Next Steps: Schedule an appointment as soon as possible for a visit in 1 week(s)    Instructions: Out-patient Primary Care Hospital Follow-up in 1 week         Infliximab Counseling:  I discussed with the patient the risks of infliximab including but not limited to myelosuppression, immunosuppression, autoimmune hepatitis, demyelinating diseases, lymphoma, and serious infections.  The patient understands that monitoring is required including a PPD at baseline and must alert us or the primary physician if symptoms of infection or other concerning signs are noted.

## 2022-06-19 NOTE — NURSING
[facility] Nurses Note -- 4 Eyes      6/19/2022   2:21 AM      Skin assessed during: admission    [x] No Pressure Injuries Present    [x]Prevention Measures Documented      [] Yes- Altered Skin Integrity Present or Discovered   [] LDA Added if Not in Epic (Describe Wound)   [] New Altered Skin Integrity was Present on Admit and Documented in LDA   [] Wound Image Taken      Attending RN:  Lizzy Cain RN     Second RN/Staff Member:  Danita TranRN

## 2022-06-19 NOTE — PROGRESS NOTES
Patient is ready and clear for discharge from Case Management perspective; informed patients nurseWesley and discussed discharge. Reviewed with and provided patient with Written Discharge Instructions.

## 2022-06-19 NOTE — PLAN OF CARE
[facility] [place] - Telemetry  Initial Discharge Assessment       Primary Care Provider: [facility].    Admission Diagnosis: DONATO (dyspnea on exertion) [R06.00]  Uncontrolled hypertension [I10]  Chest pain [R07.9]  Abdominal pain, unspecified abdominal location [R10.9]  Urinary tract infection without hematuria, site unspecified [N39.0]  Nonintractable headache, unspecified chronicity pattern, unspecified headache type [R51.9]    Admission Date: 6/18/2022  Expected Discharge Date:     Discharge Barriers Identified: None    Payor: MEDICAID / Plan: [place] [facility] CONNECT / Product Type: Managed Medicaid /     Extended Emergency Contact Information  Primary Emergency Contact: Sli Diazvia  Home Phone: [phone]  Work Phone: [phone]  Mobile Phone: [phone]  Relation: Daughter    Discharge Plan A: Home with family (Follow-up)  Discharge Plan B: Other (TBD)      [id number] - [address] AT [address] [address]  Phone: [phone] Fax: [phone]        Initial Assessment (most recent)     Adult Discharge Assessment - 06/19/22 1002        Discharge Assessment    Assessment Type Discharge Planning Assessment     Confirmed/corrected address, phone number and insurance Yes     Confirmed Demographics Correct on Facesheet     Source of Information patient;health record     Does patient/caregiver understand observation status Yes     Reason For Admission DONATO     Lives With child(marietta), adult     Facility Arrived From: Home     Do you expect to return to your current living situation? Yes     Do you have help at home or someone to help you manage your care at home? Yes     Who are your caregiver(s) and their phone number(s)? Malini-daughter: [phone]     Prior to hospitilization cognitive status: Alert/Oriented     Current cognitive status: Alert/Oriented     Walking or Climbing Stairs Difficulty  none     Dressing/Bathing Difficulty none     Do you have any problems with: Errands/Grocery;Needs other help     Specify other help Malini-daughter assists and transports     Home Accessibility wheelchair accessible     Home Layout Able to live on 1st floor     Equipment Currently Used at Home none     Readmission within 30 days? No     Patient currently being followed by outpatient case management? No     Do you currently have service(s) that help you manage your care at home? No     Do you take prescription medications? Yes     Do you have prescription coverage? Yes     Coverage Medicaid     Do you have any problems affording any of your prescribed medications? No     Is the patient taking medications as prescribed? yes     Who is going to help you get home at discharge? Edson     How do you get to doctors appointments? family or friend will provide     Are you on dialysis? No     Do you take coumadin? No     Discharge Plan A Home with family   Follow-up    Discharge Plan B Other   TBD    DME Needed Upon Discharge  other (see comments)   TBD    Discharge Plan discussed with: Patient;Adult children     Discharge Barriers Identified None        Relationship/Environment    Name(s) of Who Lives With Patient Edson             SW Role explained to patient; two patient identifiers recognized; SW contact information placed on Communication board. Discussed patient managing health care at home; determined who would be helping patient at home with recovery: tristan Nayak, lives with patient and will help with recovery at home.    PCP: Kee Tang.    Extended Emergency Contact Information  Primary Emergency Contact: Sil Diazvia  Home Phone: [phone]  Work Phone: [phone]  Mobile Phone: [phone]  Relation: Daughter       [id number] - [place] - [id number] AT [facility] [id number] [address] [address]  [place]  [address]  Phone: [phone] Fax: [phone]    Payor: MEDICAID / Plan: [facility] / Product Type: Managed Medicaid /

## 2022-06-19 NOTE — NURSING
Pt ok to eat due to no symptoms that brought her to hospital. Secure chat MD. Pt to be d/c. Pt ordered lunch.

## 2022-06-19 NOTE — ASSESSMENT & PLAN NOTE
Atypical chest pain  As pt has hx of SOB on minimal exertion will order stress test  She complains of epigastric pain with nausea and vomiting with radiation into her chest. Reports a history of GERD no longer taking medication, she reports eating tomatoes over the last few days. EKG without ST elevation, troponin negative, chest without acute process, CT abdomen without acute process.  Continue home aspirin/statin/beta blocker  Will  Start famotidine given history of GERD  Recent Labs   Lab 06/19/22  0337   TROPONINI <0.006       Telemetry  Will check lipase    Results for orders placed during the hospital encounter of 06/18/22    Echo    Interpretation Summary  · The left ventricle is normal in size with mild eccentric hypertrophy and normal systolic function.  · The estimated ejection fraction is 70%.  · Grade II left ventricular diastolic dysfunction.  · Normal right ventricular size with normal right ventricular systolic function.  · The estimated PA systolic pressure is 34 mmHg.

## 2022-06-19 NOTE — PLAN OF CARE
Problem: Adult Inpatient Plan of Care  Goal: Absence of Hospital-Acquired Illness or Injury  Outcome: Ongoing, Progressing  Goal: Optimal Comfort and Wellbeing  Outcome: Ongoing, Progressing  Goal: Readiness for Transition of Care  Outcome: Ongoing, Progressing     Problem: Diabetes Comorbidity  Goal: Blood Glucose Level Within Targeted Range  Outcome: Ongoing, Progressing     Problem: Hypertension Acute  Goal: Blood Pressure Within Desired Range  Outcome: Ongoing, Progressing     Problem: Coping Ineffective  Goal: Effective Coping  Outcome: Ongoing, Progressing     Problem: Fatigue  Goal: Improved Activity Tolerance  Outcome: Ongoing, Progressing

## 2022-06-19 NOTE — PLAN OF CARE
06/19/22 1308   Post-Acute Status   Post-Acute Authorization Other  (Home; follow-up)   Coverage Medicaid   Other Status No Post-Acute Service Needs   Hospital Resources/Appts/Education Provided Provided patient/caregiver with written discharge plan information;Appointments scheduled and added to AVS;Appointments scheduled by Navigator/Coordinator;Provided education on problems/symptoms using teachback   Discharge Delays None known at this time   Discharge Plan   Discharge Plan A Home with family  (Follow-up)   Discharge Plan B Home with family  (Follow-up)

## 2022-06-19 NOTE — H&P
[facility] Medicine  History & Physical    Patient Name: Samia Diaz  MRN: [id number]  Patient Class: OP- Observation  Admission Date: 6/18/2022  Attending Physician: Gretel Ayala MD   Primary Care Provider: Primary Doctor No         Patient information was obtained from patient, past medical records and ER records.     Subjective:     Principal Problem:Chest pain    Chief Complaint:   Chief Complaint   Patient presents with    Abdominal Pain     Yesterday started feeling bad with abd pain, nausea, able to urinate but not able to pass stool. Also states having a headache. States having pressure on chest last night.        HPI: Samia Diaz 72 y.o. female with history of CVA hypertension GERD diabetes presents the hospital chief complaint of abdominal pain.  She reports beginning yesterday she has had intermittent chest abdominal pain that she describes as a burning pressure that worsens with lying improved with sitting.  He has been associated with nausea.  Her last bowel movement was the day before yesterday.  She has attempted no treatments at home.  She is concerned as she has not been taking medicine for her acid reflux and has been eating tomatoes home.  There has been associated nausea without vomiting lightheadedness.  She denies fever shortness of breath vomiting leg swelling syncope dysuria melena hematuria hematemesis.    In the ED, CT abdomen pelvis without acute intra-abdominal abnormality CT without acute process chest x-ray without acute process troponin negative amylase negative COVID negative creatinine 1.3 LFTs within normal limits.      Past Medical History:   Diagnosis Date    Diabetes mellitus     GERD (gastroesophageal reflux disease)     Hyperlipidemia     Hypertension     Stroke        Past Surgical History:   Procedure Laterality Date    Abdominal Mesh Placement         Review of patient's allergies indicates:   Allergen Reactions    Sulfa (sulfonamide  antibiotics)        No current facility-administered medications on file prior to encounter.     Current Outpatient Medications on File Prior to Encounter   Medication Sig    atenoloL (TENORMIN) 50 MG tablet Take 50 mg by mouth once daily.    atorvastatin (LIPITOR) 80 MG tablet Take 80 mg by mouth once daily.    cloNIDine (CATAPRES) 0.1 MG tablet Take 0.1 mg by mouth 2 (two) times daily.    gabapentin (NEURONTIN) 100 MG capsule Take 100 mg by mouth 2 (two) times daily.    losartan (COZAAR) 100 MG tablet Take 100 mg by mouth once daily.    metFORMIN (GLUCOPHAGE-XR) 750 MG ER 24hr tablet Take 1,000 mg by mouth daily with breakfast.    albuterol (PROVENTIL/VENTOLIN HFA) 90 mcg/actuation inhaler Inhale 1-2 puffs into the lungs every 6 (six) hours as needed for Wheezing or Shortness of Breath. Rescue    benazepril-hydrochlorthiazide (LOTENSIN HCT) 20-25 mg Tab Take 1 tablet by mouth once daily.    loratadine (CLARITIN) 10 mg tablet Take 1 tablet (10 mg total) by mouth once daily.    ondansetron (ZOFRAN-ODT) 8 MG TbDL Take 1 tablet (8 mg total) by mouth every 6 (six) hours as needed (nausea).     Family History    Denies family history       Tobacco Use    Smoking status: Never Smoker    Smokeless tobacco: Never Used   Substance and Sexual Activity    Alcohol use: Not Currently    Drug use: Never    Sexual activity: Not Currently     Partners: Male     Review of Systems   Constitutional:  Negative for chills and fever.   HENT:  Negative for nosebleeds and tinnitus.    Eyes:  Negative for photophobia and visual disturbance.   Respiratory:  Negative for shortness of breath and wheezing.    Cardiovascular:  Positive for chest pain. Negative for palpitations and leg swelling.   Gastrointestinal:  Positive for abdominal pain, nausea and vomiting. Negative for abdominal distention.   Genitourinary:  Negative for dysuria, flank pain and hematuria.   Musculoskeletal:  Negative for gait problem and joint swelling.    Skin:  Negative for rash and wound.   Neurological:  Negative for seizures and syncope.   Objective:     Vital Signs (Most Recent):  Temp: 98.4 °F (36.9 °C) (06/18/22 2027)  Pulse: 64 (06/18/22 2027)  Resp: 18 (06/18/22 2027)  BP: (!) 185/77 (06/18/22 2027)  SpO2: 99 % (06/18/22 2027)   Vital Signs (24h Range):  Temp:  [98.4 °F (36.9 °C)-98.6 °F (37 °C)] 98.4 °F (36.9 °C)  Pulse:  [56-64] 64  Resp:  [16-32] 18  SpO2:  [96 %-100 %] 99 %  BP: (167-236)/(75-96) 185/77     Weight: 94 kg (207 lb 3.7 oz)  Body mass index is 37.9 kg/m².    Physical Exam  Vitals and nursing note reviewed.   Constitutional:       General: She is not in acute distress.     Appearance: She is well-developed.      Comments: Ambulatory in room   HENT:      Head: Normocephalic and atraumatic.      Right Ear: External ear normal.      Left Ear: External ear normal.   Eyes:      General:         Right eye: No discharge.         Left eye: No discharge.      Conjunctiva/sclera: Conjunctivae normal.   Neck:      Thyroid: No thyromegaly.   Cardiovascular:      Rate and Rhythm: Normal rate and regular rhythm.      Heart sounds: No murmur heard.  Pulmonary:      Effort: Pulmonary effort is normal. No respiratory distress.      Breath sounds: Normal breath sounds.   Abdominal:      General: Bowel sounds are normal. There is no distension.      Palpations: Abdomen is soft. There is no mass.      Tenderness: There is abdominal tenderness (minimal epigastr tenderness).   Musculoskeletal:         General: No deformity.      Cervical back: Normal range of motion.      Right lower leg: No edema.      Left lower leg: No edema.   Skin:     General: Skin is warm and dry.   Neurological:      Mental Status: She is alert and oriented to person, place, and time.      Sensory: No sensory deficit.   Psychiatric:         Mood and Affect: Mood normal.         Behavior: Behavior normal.           Significant Labs:   CBC  WBC: 9.4  H&H: 12&38  PLTs: 313  CMP  Sodium:  141  Potassium: 4.9  Chloride: 102  BUN: 15  Cr: 1.3  Glucose: 156  Alk Phos: 81  Albumin: 3.9  AST/ALT: 21/24  GGT: 18  Amylase: 63  Troponin: 0.00  Calcium: 10.5    COVID negative    Significant Imaging:   Imaging Results              CT Head Without Contrast (Final result)  Result time 06/18/22 17:24:04      Final result by Papito Odom MD (06/18/22 17:24:04)                   Impression:      No acute large vascular territory infarct or intracranial hemorrhage identified.  If persistent neurologic deficit, MRI brain can be obtained.    Remote left occipital cortical infarct.    Left thalamic small remote lacunar infarct.      Electronically signed by: Papito Odom MD  Date:    06/18/2022  Time:    17:24               Narrative:    EXAMINATION:  CT HEAD WITHOUT CONTRAST    CLINICAL HISTORY:  Headache, new or worsening (Age >= 50y);    TECHNIQUE:  Low dose axial CT images obtained throughout the head without intravenous contrast. Sagittal and coronal reconstructions were performed.    COMPARISON:  Head CT most recent 05/10/2020    FINDINGS:  Intracranial compartment:    Age-related generalized cerebral volume loss.  The ventricles are midline and stable in overall size and configuration without distortion by mass effect or acute hydrocephalus noting ex vacuo dilatation of the posterior horn on the left.  Stable encephalomalacia of the left occipital lobe suggesting remote infarct.  Left thalamic remote lacunar type infarct stable.  Grossly stable distribution of mild patchy hypoattenuation of the subcortical and periventricular white matter consistent with chronic microvascular ischemic change.  Bilateral basal ganglia calcifications noted.  No extra-axial blood or fluid collections.  Skull base atherosclerotic vascular calcifications noted.    No definite new focal areas of abnormal parenchymal attenuation.  No parenchymal mass, hemorrhage, edema or acute major vascular distribution  infarct.    Skull/extracranial contents (limited evaluation): Hyperostosis frontalis interna.  No fracture. Mastoid air cells and paranasal sinuses are essentially clear.  Imaged portions of the orbits are within normal limits.                                       CT Abdomen Pelvis  Without Contrast (Final result)  Result time 06/18/22 17:29:54      Final result by April Wise MD (06/18/22 17:29:54)                   Impression:      1. No acute intra-abdominal abnormalities identified.  2. Small nonobstructing left renal stone.  3. Additional findings as detailed above.      Electronically signed by: April Wise MD  Date:    06/18/2022  Time:    17:29               Narrative:    EXAMINATION:  CT ABDOMEN PELVIS WITHOUT CONTRAST    CLINICAL HISTORY:  Nausea/vomiting;Abdominal pain, acute, nonlocalized;    TECHNIQUE:  Low dose axial images, sagittal and coronal reformations were obtained from the lung bases to the pubic symphysis.  Oral contrast was not administered.    COMPARISON:  None    FINDINGS:  The visualized portion of the heart is unremarkable.  The lung bases are clear.    No significant hepatic abnormalities are identified.  There is no intra-or extrahepatic biliary ductal dilatation.  The gallbladder is unremarkable.  The stomach, pancreas, spleen, and adrenal glands are unremarkable.    Small nonobstructing left renal stone is seen.  Kidneys show no hydronephrosis.  Suspected phleboliths are seen near the distal ureteral courses.  Urinary bladder is nondistended.  Uterus is unremarkable.    Appendix is visualized and is unremarkable.  The visualized loops of small and large bowel show no evidence of obstruction or inflammation.  Few scattered colonic diverticula are seen.  Colonic interposition is noted anterior to the liver.  No free air or free fluid.    Aorta tapers normally with extensive atherosclerosis.    No acute osseous abnormality identified.  Degenerative changes seen within the  lumbar spine with intervertebral disc space narrowing most pronounced at the L2-3 and L5-S1 levels.  There is mild grade 1 anterolisthesis of L5 on S1.  Subcutaneous soft tissue show no significant abnormalities.                                       X-Ray Chest AP Portable (Final result)  Result time 06/18/22 15:06:19      Final result by Marta Wilson MD (06/18/22 15:06:19)                   Impression:      No acute abnormality.      Electronically signed by: Marta Wilson MD  Date:    06/18/2022  Time:    15:06               Narrative:    EXAMINATION:  XR CHEST AP PORTABLE    CLINICAL HISTORY:  Chest Pain;    TECHNIQUE:  Single frontal view of the chest was performed.    COMPARISON:  03/11/2022    FINDINGS:  The lungs are clear with normal appearance of pulmonary vasculature. No pleural effusion. No evident pneumothorax.    The cardiac silhouette is normal in size. The hilar and mediastinal contours are unremarkable.Calcifications of the aortic knob.    Bones are intact.                                        Assessment/Plan:     * Chest pain  She complains of epigastric pain with nausea and vomiting with radiation into her chest. Reports a history of GERD no longer taking medication, she reports eating tomatoes over the last few days. EKG without ST elevation, troponin negative, chest without acute process, CT abdomen without acute process.  Continue home aspirin/statin/beta blocker  Will trial a GI cocktail given history of GERD  Troponin trend  Telemetry  Echo  Will check lipase    CKD (chronic kidney disease) stage 3, GFR 30-59 ml/min  Followed by nephrology outpatient. Baseline Cr of 1.3. Cr today of 1.3  Continue home losartan  Renal dose medications avoid nephrotoxic drugs monitor intake and output    History of CVA (cerebrovascular accident)  History of CVA denies any new symptoms  Continue home aspirin/statin    Diabetes mellitus, type 2  No results found for: LABA1C, HGBA1C  Will start sliding  scale insulin, diabetic diet, goal -180  A1c pending    Essential hypertension  Continue home clonidine, losartan, and atenolol      VTE Risk Mitigation (From admission, onward)         Ordered     IP VTE HIGH RISK PATIENT  Once         06/18/22 1802     Place sequential compression device  Until discontinued         06/18/22 1802     Place KENYETTA hose  Until discontinued         06/18/22 1802              VTE: KENYETTA/SCD  Code: Full  Diet: diabetic  Dispo: pending troponin trend and echo  As clarification, on 6/18/2022, patient should be admitted for hospital observation services under my care in collaboration with Gretel Ayala MD. Phil Ash PA-C  Department of Hospital Medicine   [facility] [place] - Telemetry

## 2022-06-19 NOTE — SUBJECTIVE & OBJECTIVE
Interval History: Pts blood pressure is runnin high. BP meds readjusted. Complaining of dyspnea on exertion.     Review of Systems   Constitutional:  Negative for chills and fever.   HENT:  Negative for nosebleeds and tinnitus.    Eyes:  Negative for photophobia and visual disturbance.   Respiratory:  Negative for shortness of breath and wheezing.    Cardiovascular:  Positive for chest pain. Negative for palpitations and leg swelling.   Gastrointestinal:  Positive for abdominal pain, nausea and vomiting. Negative for abdominal distention.   Genitourinary:  Negative for dysuria, flank pain and hematuria.   Musculoskeletal:  Negative for gait problem and joint swelling.   Skin:  Negative for rash and wound.   Neurological:  Negative for seizures and syncope.   Objective:     Vital Signs (Most Recent):  Temp: 98 °F (36.7 °C) (06/19/22 1057)  Pulse: 63 (06/19/22 1057)  Resp: 18 (06/19/22 1057)  BP: (!) 186/79 (06/19/22 1057)  SpO2: (!) 94 % (06/19/22 1057)   Vital Signs (24h Range):  Temp:  [98 °F (36.7 °C)-98.5 °F (36.9 °C)] 98 °F (36.7 °C)  Pulse:  [54-66] 63  Resp:  [18-32] 18  SpO2:  [94 %-100 %] 94 %  BP: (137-236)/(66-96) 186/79     Weight: 94 kg (207 lb 3.7 oz)  Body mass index is 37.9 kg/m².    Intake/Output Summary (Last 24 hours) at 6/19/2022 1453  Last data filed at 6/19/2022 0723  Gross per 24 hour   Intake 50 ml   Output --   Net 50 ml      Physical Exam  Vitals and nursing note reviewed.   Constitutional:       General: She is not in acute distress.     Appearance: She is well-developed.      Comments: Ambulatory in room   HENT:      Head: Normocephalic and atraumatic.      Right Ear: External ear normal.      Left Ear: External ear normal.   Eyes:      General:         Right eye: No discharge.         Left eye: No discharge.      Conjunctiva/sclera: Conjunctivae normal.   Neck:      Thyroid: No thyromegaly.   Cardiovascular:      Rate and Rhythm: Normal rate and regular rhythm.      Heart sounds: No  murmur heard.  Pulmonary:      Effort: Pulmonary effort is normal. No respiratory distress.      Breath sounds: Normal breath sounds.   Abdominal:      General: Bowel sounds are normal. There is no distension.      Palpations: Abdomen is soft. There is no mass.      Tenderness: There is abdominal tenderness (minimal epigastr tenderness).   Musculoskeletal:         General: No deformity.      Cervical back: Normal range of motion.      Right lower leg: No edema.      Left lower leg: No edema.   Skin:     General: Skin is warm and dry.   Neurological:      Mental Status: She is alert and oriented to person, place, and time.      Sensory: No sensory deficit.   Psychiatric:         Mood and Affect: Mood normal.         Behavior: Behavior normal.       Significant Labs: All pertinent labs within the past 24 hours have been reviewed.  BMP:   Recent Labs   Lab 06/19/22  0337   *      K 4.4      CO2 19*   BUN 20   CREATININE 1.3   CALCIUM 10.0     CBC:   Recent Labs   Lab 06/19/22  0337   WBC 11.30   HGB 11.7*   HCT 37.0          Significant Imaging: I have reviewed all pertinent imaging results/findings within the past 24 hours.

## 2022-06-19 NOTE — ASSESSMENT & PLAN NOTE
Meds changed as blood pressure poorly controlled  Will continue to monitor Bps  Start hydralazine and HCTZ

## 2022-06-19 NOTE — PROGRESS NOTES
[facility]  Progress Note    Patient Name: Samia Diaz  MRN: [id number]  Patient Class: OP- Observation   Admission Date: 6/18/2022  Length of Stay: 0 days  Attending Physician: Maria Ines Watts MD  Primary Care Provider: Primary Doctor No        Subjective:     Principal Problem:Chest pain        HPI:  Samia Diaz 72 y.o. female with history of CVA hypertension GERD diabetes presents the hospital chief complaint of abdominal pain.  She reports beginning yesterday she has had intermittent chest abdominal pain that she describes as a burning pressure that worsens with lying improved with sitting.  He has been associated with nausea.  Her last bowel movement was the day before yesterday.  She has attempted no treatments at home.  She is concerned as she has not been taking medicine for her acid reflux and has been eating tomatoes home.  There has been associated nausea without vomiting lightheadedness.  She denies fever shortness of breath vomiting leg swelling syncope dysuria melena hematuria hematemesis.    In the ED, CT abdomen pelvis without acute intra-abdominal abnormality CT without acute process chest x-ray without acute process troponin negative amylase negative COVID negative creatinine 1.3 LFTs within normal limits.      Overview/Hospital Course:  No notes on file    Interval History: Pts blood pressure is runnin high. BP meds readjusted. Complaining of dyspnea on exertion.     Review of Systems   Constitutional:  Negative for chills and fever.   HENT:  Negative for nosebleeds and tinnitus.    Eyes:  Negative for photophobia and visual disturbance.   Respiratory:  Negative for shortness of breath and wheezing.    Cardiovascular:  Positive for chest pain. Negative for palpitations and leg swelling.   Gastrointestinal:  Positive for abdominal pain, nausea and vomiting. Negative for abdominal distention.   Genitourinary:  Negative for dysuria, flank pain and hematuria.    Musculoskeletal:  Negative for gait problem and joint swelling.   Skin:  Negative for rash and wound.   Neurological:  Negative for seizures and syncope.   Objective:     Vital Signs (Most Recent):  Temp: 98 °F (36.7 °C) (06/19/22 1057)  Pulse: 63 (06/19/22 1057)  Resp: 18 (06/19/22 1057)  BP: (!) 186/79 (06/19/22 1057)  SpO2: (!) 94 % (06/19/22 1057)   Vital Signs (24h Range):  Temp:  [98 °F (36.7 °C)-98.5 °F (36.9 °C)] 98 °F (36.7 °C)  Pulse:  [54-66] 63  Resp:  [18-32] 18  SpO2:  [94 %-100 %] 94 %  BP: (137-236)/(66-96) 186/79     Weight: 94 kg (207 lb 3.7 oz)  Body mass index is 37.9 kg/m².    Intake/Output Summary (Last 24 hours) at 6/19/2022 1453  Last data filed at 6/19/2022 0723  Gross per 24 hour   Intake 50 ml   Output --   Net 50 ml      Physical Exam  Vitals and nursing note reviewed.   Constitutional:       General: She is not in acute distress.     Appearance: She is well-developed.      Comments: Ambulatory in room   HENT:      Head: Normocephalic and atraumatic.      Right Ear: External ear normal.      Left Ear: External ear normal.   Eyes:      General:         Right eye: No discharge.         Left eye: No discharge.      Conjunctiva/sclera: Conjunctivae normal.   Neck:      Thyroid: No thyromegaly.   Cardiovascular:      Rate and Rhythm: Normal rate and regular rhythm.      Heart sounds: No murmur heard.  Pulmonary:      Effort: Pulmonary effort is normal. No respiratory distress.      Breath sounds: Normal breath sounds.   Abdominal:      General: Bowel sounds are normal. There is no distension.      Palpations: Abdomen is soft. There is no mass.      Tenderness: There is abdominal tenderness (minimal epigastr tenderness).   Musculoskeletal:         General: No deformity.      Cervical back: Normal range of motion.      Right lower leg: No edema.      Left lower leg: No edema.   Skin:     General: Skin is warm and dry.   Neurological:      Mental Status: She is alert and oriented to person,  place, and time.      Sensory: No sensory deficit.   Psychiatric:         Mood and Affect: Mood normal.         Behavior: Behavior normal.       Significant Labs: All pertinent labs within the past 24 hours have been reviewed.  BMP:   Recent Labs   Lab 06/19/22  0337   *      K 4.4      CO2 19*   BUN 20   CREATININE 1.3   CALCIUM 10.0     CBC:   Recent Labs   Lab 06/19/22 0337   WBC 11.30   HGB 11.7*   HCT 37.0          Significant Imaging: I have reviewed all pertinent imaging results/findings within the past 24 hours.      Assessment/Plan:      * Chest pain  Atypical chest pain  As pt has hx of SOB on minimal exertion will order stress test  She complains of epigastric pain with nausea and vomiting with radiation into her chest. Reports a history of GERD no longer taking medication, she reports eating tomatoes over the last few days. EKG without ST elevation, troponin negative, chest without acute process, CT abdomen without acute process.  Continue home aspirin/statin/beta blocker  Will  Start famotidine given history of GERD  Recent Labs   Lab 06/19/22 0337   TROPONINI <0.006       Telemetry  Will check lipase    Results for orders placed during the hospital encounter of 06/18/22    Echo    Interpretation Summary  · The left ventricle is normal in size with mild eccentric hypertrophy and normal systolic function.  · The estimated ejection fraction is 70%.  · Grade II left ventricular diastolic dysfunction.  · Normal right ventricular size with normal right ventricular systolic function.  · The estimated PA systolic pressure is 34 mmHg.      CKD (chronic kidney disease) stage 3, GFR 30-59 ml/min  Followed by nephrology outpatient. Baseline Cr of 1.3. Cr today of 1.3  Continue home losartan  Renal dose medications avoid nephrotoxic drugs monitor intake and output    History of CVA (cerebrovascular accident)  History of CVA denies any new symptoms  Continue home  aspirin/statin    Diabetes mellitus, type 2  No results found for: LABA1C, HGBA1C  Will start sliding scale insulin, diabetic diet, goal -180  A1c pending    Hypertensive urgency  Meds changed as blood pressure poorly controlled  Will continue to monitor Bps  Start hydralazine and HCTZ      VTE Risk Mitigation (From admission, onward)         Ordered     IP VTE HIGH RISK PATIENT  Once         06/18/22 1802     Place sequential compression device  Until discontinued         06/18/22 1802     Place KENYETTA hose  Until discontinued         06/18/22 1802                Discharge Planning   ALVARO: 6/19/2022     Code Status: Full Code   Is the patient medically ready for discharge?:     Reason for patient still in hospital (select all that apply): Patient trending condition and Treatment  Discharge Plan A: Home with family (Follow-up)   Discharge Delays: None known at this time              Maria Ines Watts MD  Department of Hospital Medicine   [facility] [place] [facility]

## 2022-06-19 NOTE — ASSESSMENT & PLAN NOTE
She complains of epigastric pain with nausea and vomiting with radiation into her chest. Reports a history of GERD no longer taking medication, she reports eating tomatoes over the last few days. EKG without ST elevation, troponin negative, chest without acute process, CT abdomen without acute process.  Continue home aspirin/statin/beta blocker  Will trial a GI cocktail given history of GERD  Troponin trend  Telemetry  Echo  Will check lipase

## 2022-06-19 NOTE — HPI
Samia Diaz 72 y.o. female with history of CVA hypertension GERD diabetes presents the hospital chief complaint of abdominal pain.  She reports beginning yesterday she has had intermittent chest abdominal pain that she describes as a burning pressure that worsens with lying improved with sitting.  He has been associated with nausea.  Her last bowel movement was the day before yesterday.  She has attempted no treatments at home.  She is concerned as she has not been taking medicine for her acid reflux and has been eating tomatoes home.  There has been associated nausea without vomiting lightheadedness.  She denies fever shortness of breath vomiting leg swelling syncope dysuria melena hematuria hematemesis.    In the ED, CT abdomen pelvis without acute intra-abdominal abnormality CT without acute process chest x-ray without acute process troponin negative amylase negative COVID negative creatinine 1.3 LFTs within normal limits.

## 2022-06-19 NOTE — ASSESSMENT & PLAN NOTE
No results found for: LABA1C, HGBA1C  Will start sliding scale insulin, diabetic diet, goal -180  A1c pending

## 2022-06-19 NOTE — SUBJECTIVE & OBJECTIVE
Past Medical History:   Diagnosis Date    Diabetes mellitus     GERD (gastroesophageal reflux disease)     Hyperlipidemia     Hypertension     Stroke        Past Surgical History:   Procedure Laterality Date    Abdominal Mesh Placement         Review of patient's allergies indicates:   Allergen Reactions    Sulfa (sulfonamide antibiotics)        No current facility-administered medications on file prior to encounter.     Current Outpatient Medications on File Prior to Encounter   Medication Sig    atenoloL (TENORMIN) 50 MG tablet Take 50 mg by mouth once daily.    atorvastatin (LIPITOR) 80 MG tablet Take 80 mg by mouth once daily.    cloNIDine (CATAPRES) 0.1 MG tablet Take 0.1 mg by mouth 2 (two) times daily.    gabapentin (NEURONTIN) 100 MG capsule Take 100 mg by mouth 2 (two) times daily.    losartan (COZAAR) 100 MG tablet Take 100 mg by mouth once daily.    metFORMIN (GLUCOPHAGE-XR) 750 MG ER 24hr tablet Take 1,000 mg by mouth daily with breakfast.    albuterol (PROVENTIL/VENTOLIN HFA) 90 mcg/actuation inhaler Inhale 1-2 puffs into the lungs every 6 (six) hours as needed for Wheezing or Shortness of Breath. Rescue    benazepril-hydrochlorthiazide (LOTENSIN HCT) 20-25 mg Tab Take 1 tablet by mouth once daily.    loratadine (CLARITIN) 10 mg tablet Take 1 tablet (10 mg total) by mouth once daily.    ondansetron (ZOFRAN-ODT) 8 MG TbDL Take 1 tablet (8 mg total) by mouth every 6 (six) hours as needed (nausea).     Family History    Denies family history       Tobacco Use    Smoking status: Never Smoker    Smokeless tobacco: Never Used   Substance and Sexual Activity    Alcohol use: Not Currently    Drug use: Never    Sexual activity: Not Currently     Partners: Male     Review of Systems   Constitutional:  Negative for chills and fever.   HENT:  Negative for nosebleeds and tinnitus.    Eyes:  Negative for photophobia and visual disturbance.   Respiratory:  Negative for shortness of breath and wheezing.     Cardiovascular:  Positive for chest pain. Negative for palpitations and leg swelling.   Gastrointestinal:  Positive for abdominal pain, nausea and vomiting. Negative for abdominal distention.   Genitourinary:  Negative for dysuria, flank pain and hematuria.   Musculoskeletal:  Negative for gait problem and joint swelling.   Skin:  Negative for rash and wound.   Neurological:  Negative for seizures and syncope.   Objective:     Vital Signs (Most Recent):  Temp: 98.4 °F (36.9 °C) (06/18/22 2027)  Pulse: 64 (06/18/22 2027)  Resp: 18 (06/18/22 2027)  BP: (!) 185/77 (06/18/22 2027)  SpO2: 99 % (06/18/22 2027)   Vital Signs (24h Range):  Temp:  [98.4 °F (36.9 °C)-98.6 °F (37 °C)] 98.4 °F (36.9 °C)  Pulse:  [56-64] 64  Resp:  [16-32] 18  SpO2:  [96 %-100 %] 99 %  BP: (167-236)/(75-96) 185/77     Weight: 94 kg (207 lb 3.7 oz)  Body mass index is 37.9 kg/m².    Physical Exam  Vitals and nursing note reviewed.   Constitutional:       General: She is not in acute distress.     Appearance: She is well-developed.      Comments: Ambulatory in room   HENT:      Head: Normocephalic and atraumatic.      Right Ear: External ear normal.      Left Ear: External ear normal.   Eyes:      General:         Right eye: No discharge.         Left eye: No discharge.      Conjunctiva/sclera: Conjunctivae normal.   Neck:      Thyroid: No thyromegaly.   Cardiovascular:      Rate and Rhythm: Normal rate and regular rhythm.      Heart sounds: No murmur heard.  Pulmonary:      Effort: Pulmonary effort is normal. No respiratory distress.      Breath sounds: Normal breath sounds.   Abdominal:      General: Bowel sounds are normal. There is no distension.      Palpations: Abdomen is soft. There is no mass.      Tenderness: There is abdominal tenderness (minimal epigastr tenderness).   Musculoskeletal:         General: No deformity.      Cervical back: Normal range of motion.      Right lower leg: No edema.      Left lower leg: No edema.   Skin:      General: Skin is warm and dry.   Neurological:      Mental Status: She is alert and oriented to person, place, and time.      Sensory: No sensory deficit.   Psychiatric:         Mood and Affect: Mood normal.         Behavior: Behavior normal.           Significant Labs:   CBC  WBC: 9.4  H&H: 12&38  PLTs: 313  CMP  Sodium: 141  Potassium: 4.9  Chloride: 102  BUN: 15  Cr: 1.3  Glucose: 156  Alk Phos: 81  Albumin: 3.9  AST/ALT: 21/24  GGT: 18  Amylase: 63  Troponin: 0.00  Calcium: 10.5    COVID negative    Significant Imaging:   Imaging Results              CT Head Without Contrast (Final result)  Result time 06/18/22 17:24:04      Final result by Papito Odom MD (06/18/22 17:24:04)                   Impression:      No acute large vascular territory infarct or intracranial hemorrhage identified.  If persistent neurologic deficit, MRI brain can be obtained.    Remote left occipital cortical infarct.    Left thalamic small remote lacunar infarct.      Electronically signed by: Papito Odom MD  Date:    06/18/2022  Time:    17:24               Narrative:    EXAMINATION:  CT HEAD WITHOUT CONTRAST    CLINICAL HISTORY:  Headache, new or worsening (Age >= 50y);    TECHNIQUE:  Low dose axial CT images obtained throughout the head without intravenous contrast. Sagittal and coronal reconstructions were performed.    COMPARISON:  Head CT most recent 05/10/2020    FINDINGS:  Intracranial compartment:    Age-related generalized cerebral volume loss.  The ventricles are midline and stable in overall size and configuration without distortion by mass effect or acute hydrocephalus noting ex vacuo dilatation of the posterior horn on the left.  Stable encephalomalacia of the left occipital lobe suggesting remote infarct.  Left thalamic remote lacunar type infarct stable.  Grossly stable distribution of mild patchy hypoattenuation of the subcortical and periventricular white matter consistent with chronic microvascular ischemic  change.  Bilateral basal ganglia calcifications noted.  No extra-axial blood or fluid collections.  Skull base atherosclerotic vascular calcifications noted.    No definite new focal areas of abnormal parenchymal attenuation.  No parenchymal mass, hemorrhage, edema or acute major vascular distribution infarct.    Skull/extracranial contents (limited evaluation): Hyperostosis frontalis interna.  No fracture. Mastoid air cells and paranasal sinuses are essentially clear.  Imaged portions of the orbits are within normal limits.                                       CT Abdomen Pelvis  Without Contrast (Final result)  Result time 06/18/22 17:29:54      Final result by April Wise MD (06/18/22 17:29:54)                   Impression:      1. No acute intra-abdominal abnormalities identified.  2. Small nonobstructing left renal stone.  3. Additional findings as detailed above.      Electronically signed by: April Wise MD  Date:    06/18/2022  Time:    17:29               Narrative:    EXAMINATION:  CT ABDOMEN PELVIS WITHOUT CONTRAST    CLINICAL HISTORY:  Nausea/vomiting;Abdominal pain, acute, nonlocalized;    TECHNIQUE:  Low dose axial images, sagittal and coronal reformations were obtained from the lung bases to the pubic symphysis.  Oral contrast was not administered.    COMPARISON:  None    FINDINGS:  The visualized portion of the heart is unremarkable.  The lung bases are clear.    No significant hepatic abnormalities are identified.  There is no intra-or extrahepatic biliary ductal dilatation.  The gallbladder is unremarkable.  The stomach, pancreas, spleen, and adrenal glands are unremarkable.    Small nonobstructing left renal stone is seen.  Kidneys show no hydronephrosis.  Suspected phleboliths are seen near the distal ureteral courses.  Urinary bladder is nondistended.  Uterus is unremarkable.    Appendix is visualized and is unremarkable.  The visualized loops of small and large bowel show no evidence  of obstruction or inflammation.  Few scattered colonic diverticula are seen.  Colonic interposition is noted anterior to the liver.  No free air or free fluid.    Aorta tapers normally with extensive atherosclerosis.    No acute osseous abnormality identified.  Degenerative changes seen within the lumbar spine with intervertebral disc space narrowing most pronounced at the L2-3 and L5-S1 levels.  There is mild grade 1 anterolisthesis of L5 on S1.  Subcutaneous soft tissue show no significant abnormalities.                                       X-Ray Chest AP Portable (Final result)  Result time 06/18/22 15:06:19      Final result by Marta Wilson MD (06/18/22 15:06:19)                   Impression:      No acute abnormality.      Electronically signed by: Marta Wilson MD  Date:    06/18/2022  Time:    15:06               Narrative:    EXAMINATION:  XR CHEST AP PORTABLE    CLINICAL HISTORY:  Chest Pain;    TECHNIQUE:  Single frontal view of the chest was performed.    COMPARISON:  03/11/2022    FINDINGS:  The lungs are clear with normal appearance of pulmonary vasculature. No pleural effusion. No evident pneumothorax.    The cardiac silhouette is normal in size. The hilar and mediastinal contours are unremarkable.Calcifications of the aortic knob.    Bones are intact.

## 2022-06-19 NOTE — ASSESSMENT & PLAN NOTE
Followed by nephrology outpatient. Baseline Cr of 1.3. Cr today of 1.3  Continue home losartan  Renal dose medications avoid nephrotoxic drugs monitor intake and output

## 2022-06-19 NOTE — NURSING
Pt refused her hydrochlorothiazide when ordered. Pt educated on importance of controlling her BP. Initially resistant to receiving prn clonidine for elevated BP as well. Pt compliant when daughter in the room.

## 2022-06-19 NOTE — NURSING
"Pt asking for dilaudid for HA and left shoulder pain. She is advised it is not ordered. She is offered Fioricet for HA, she refuses and states that the "pills don't help". MD sent secure chat for update. Pt does not appear to be in distress, appears comfortable in bed while on her phone.   "

## 2022-06-20 VITALS
WEIGHT: 199.94 LBS | BODY MASS INDEX: 36.79 KG/M2 | DIASTOLIC BLOOD PRESSURE: 70 MMHG | OXYGEN SATURATION: 96 % | HEIGHT: 62 IN | HEART RATE: 64 BPM | SYSTOLIC BLOOD PRESSURE: 157 MMHG | RESPIRATION RATE: 18 BRPM | TEMPERATURE: 99 F

## 2022-06-20 PROBLEM — I70.0 AORTIC ATHEROSCLEROSIS: Status: ACTIVE | Noted: 2022-06-20

## 2022-06-20 PROBLEM — N18.32 STAGE 3B CHRONIC KIDNEY DISEASE: Status: ACTIVE | Noted: 2022-06-18

## 2022-06-20 PROBLEM — E11.69 HYPERLIPIDEMIA ASSOCIATED WITH TYPE 2 DIABETES MELLITUS: Status: ACTIVE | Noted: 2022-06-20

## 2022-06-20 PROBLEM — E78.5 HYPERLIPIDEMIA ASSOCIATED WITH TYPE 2 DIABETES MELLITUS: Status: ACTIVE | Noted: 2022-06-20

## 2022-06-20 LAB
CV STRESS BASE HR: 66 BPM
DIASTOLIC BLOOD PRESSURE: 73 MMHG
OHS CV CPX 85 PERCENT MAX PREDICTED HEART RATE MALE: 121
OHS CV CPX MAX PREDICTED HEART RATE: 143
OHS CV CPX PATIENT IS FEMALE: 1
OHS CV CPX PATIENT IS MALE: 0
OHS CV CPX PEAK DIASTOLIC BLOOD PRESSURE: 63 MMHG
OHS CV CPX PEAK HEAR RATE: 107 BPM
OHS CV CPX PEAK RATE PRESSURE PRODUCT: NORMAL
OHS CV CPX PEAK SYSTOLIC BLOOD PRESSURE: 191 MMHG
OHS CV CPX PERCENT MAX PREDICTED HEART RATE ACHIEVED: 75
OHS CV CPX RATE PRESSURE PRODUCT PRESENTING: NORMAL
POCT GLUCOSE: 174 MG/DL (ref 70–110)
SYSTOLIC BLOOD PRESSURE: 180 MMHG

## 2022-06-20 PROCEDURE — 63600175 PHARM REV CODE 636 W HCPCS: Performed by: INTERNAL MEDICINE

## 2022-06-20 PROCEDURE — 99220 PR INITIAL OBSERVATION CARE,LEVL III: ICD-10-PCS | Mod: 25,,, | Performed by: INTERNAL MEDICINE

## 2022-06-20 PROCEDURE — 25000003 PHARM REV CODE 250: Performed by: PHYSICIAN ASSISTANT

## 2022-06-20 PROCEDURE — 99220 PR INITIAL OBSERVATION CARE,LEVL III: CPT | Mod: 25,,, | Performed by: INTERNAL MEDICINE

## 2022-06-20 PROCEDURE — 25000003 PHARM REV CODE 250: Performed by: INTERNAL MEDICINE

## 2022-06-20 PROCEDURE — G0378 HOSPITAL OBSERVATION PER HR: HCPCS

## 2022-06-20 RX ORDER — HYDRALAZINE HYDROCHLORIDE 25 MG/1
25 TABLET, FILM COATED ORAL EVERY 8 HOURS
Qty: 90 TABLET | Refills: 11 | Status: SHIPPED | OUTPATIENT
Start: 2022-06-20 | End: 2023-06-20

## 2022-06-20 RX ORDER — REGADENOSON 0.08 MG/ML
0.4 INJECTION, SOLUTION INTRAVENOUS ONCE
Status: COMPLETED | OUTPATIENT
Start: 2022-06-20 | End: 2022-06-20

## 2022-06-20 RX ORDER — HYDROCHLOROTHIAZIDE 25 MG/1
25 TABLET ORAL DAILY
Qty: 30 TABLET | Refills: 11 | Status: SHIPPED | OUTPATIENT
Start: 2022-06-21 | End: 2023-06-21

## 2022-06-20 RX ADMIN — HYDRALAZINE HYDROCHLORIDE 25 MG: 25 TABLET, FILM COATED ORAL at 05:06

## 2022-06-20 RX ADMIN — ASPIRIN 81 MG CHEWABLE TABLET 81 MG: 81 TABLET CHEWABLE at 10:06

## 2022-06-20 RX ADMIN — ATORVASTATIN CALCIUM 80 MG: 40 TABLET, FILM COATED ORAL at 10:06

## 2022-06-20 RX ADMIN — REGADENOSON 0.4 MG: 0.08 INJECTION, SOLUTION INTRAVENOUS at 09:06

## 2022-06-20 RX ADMIN — FAMOTIDINE 20 MG: 20 TABLET ORAL at 10:06

## 2022-06-20 RX ADMIN — METOPROLOL SUCCINATE 25 MG: 25 TABLET, EXTENDED RELEASE ORAL at 10:06

## 2022-06-20 RX ADMIN — LOSARTAN POTASSIUM 100 MG: 25 TABLET, FILM COATED ORAL at 10:06

## 2022-06-20 RX ADMIN — HYDROCHLOROTHIAZIDE 25 MG: 25 TABLET ORAL at 10:06

## 2022-06-20 NOTE — CONSULTS
[facility] - Telemetry  Cardiology  Consult Note    Patient Name: Samia Diaz  MRN: [id number]  Admission Date: 6/18/2022  Hospital Length of Stay: 0 days  Code Status: Full Code   Attending Provider: Raymon Carlin MD   Consulting Provider: Josef Palmer MD  Primary Care Physician: Primary Doctor No  Principal Problem:Chest pain    Patient information was obtained from patient and ER records.     Inpatient consult to Cardiology  Consult performed by: Josef Palmer MD  Consult ordered by: Maria Ines Watts MD  Reason for consult: CP        Subjective:     Chief Complaint:  CP/abd pain/HTN     HPI:   72 y.o. female with history of CVA hypertension GERD diabetes presents the hospital chief complaint of abdominal pain.  She reports beginning yesterday she has had intermittent chest abdominal pain that she describes as a burning pressure that worsens with lying improved with sitting.  He has been associated with nausea.  Her last bowel movement was the day before yesterday.  She has attempted no treatments at home.  She is concerned as she has not been taking medicine for her acid reflux and has been eating tomatoes home.  There has been associated nausea without vomiting lightheadedness.  She denies fever shortness of breath vomiting leg swelling syncope dysuria melena hematuria hematemesis.  In the ED, CT abdomen pelvis without acute intra-abdominal abnormality CT without acute process chest x-ray without acute process troponin negative amylase negative COVID negative creatinine 1.3 LFTs within normal limits.    Cardiology consulted for CP.    Patient presents with complaints of abdominal and chest discomfort.  Her blood pressure was elevated, which is under better control.  She does not have any more chest discomfort, but still having some abdominal discomfort which has been attributed to GERD.  She does have multiple risk factors for CAD.  Stress testing is planned.      Past Medical History:    Diagnosis Date    Diabetes mellitus     GERD (gastroesophageal reflux disease)     Hyperlipidemia     Hypertension     Stroke        Past Surgical History:   Procedure Laterality Date    Abdominal Mesh Placement         Review of patient's allergies indicates:   Allergen Reactions    Sulfa (sulfonamide antibiotics)        No current facility-administered medications on file prior to encounter.     Current Outpatient Medications on File Prior to Encounter   Medication Sig    atenoloL (TENORMIN) 50 MG tablet Take 50 mg by mouth once daily.    atorvastatin (LIPITOR) 80 MG tablet Take 80 mg by mouth once daily.    cloNIDine (CATAPRES) 0.1 MG tablet Take 0.1 mg by mouth 2 (two) times daily.    gabapentin (NEURONTIN) 100 MG capsule Take 100 mg by mouth 2 (two) times daily.    losartan (COZAAR) 100 MG tablet Take 100 mg by mouth once daily.    metFORMIN (GLUCOPHAGE-XR) 750 MG ER 24hr tablet Take 1,000 mg by mouth daily with breakfast.    albuterol (PROVENTIL/VENTOLIN HFA) 90 mcg/actuation inhaler Inhale 1-2 puffs into the lungs every 6 (six) hours as needed for Wheezing or Shortness of Breath. Rescue    benazepril-hydrochlorthiazide (LOTENSIN HCT) 20-25 mg Tab Take 1 tablet by mouth once daily.    loratadine (CLARITIN) 10 mg tablet Take 1 tablet (10 mg total) by mouth once daily.    ondansetron (ZOFRAN-ODT) 8 MG TbDL Take 1 tablet (8 mg total) by mouth every 6 (six) hours as needed (nausea).     Family History    None       Tobacco Use    Smoking status: Never Smoker    Smokeless tobacco: Never Used   Substance and Sexual Activity    Alcohol use: Not Currently    Drug use: Never    Sexual activity: Not Currently     Partners: Male     Review of Systems   Constitutional: Negative for chills, diaphoresis, fever and malaise/fatigue.   HENT:  Negative for nosebleeds.    Eyes:  Negative for blurred vision and double vision.   Cardiovascular:  Positive for chest pain. Negative for claudication, cyanosis,  dyspnea on exertion, leg swelling, orthopnea, palpitations, paroxysmal nocturnal dyspnea and syncope.   Respiratory:  Negative for cough, shortness of breath and wheezing.    Skin:  Negative for dry skin and poor wound healing.   Musculoskeletal:  Negative for back pain, joint swelling and myalgias.   Gastrointestinal:  Positive for abdominal pain. Negative for nausea and vomiting.   Genitourinary:  Negative for hematuria.   Neurological:  Negative for dizziness, headaches, numbness, seizures and weakness.   Psychiatric/Behavioral:  Negative for altered mental status and depression.    Objective:     Vital Signs (Most Recent):  Temp: 98.3 °F (36.8 °C) (06/20/22 0435)  Pulse: (!) 58 (06/20/22 0435)  Resp: 18 (06/20/22 0435)  BP: (!) 154/69 (06/20/22 0435)  SpO2: 96 % (06/20/22 0435)   Vital Signs (24h Range):  Temp:  [98 °F (36.7 °C)-98.7 °F (37.1 °C)] 98.3 °F (36.8 °C)  Pulse:  [58-66] 58  Resp:  [18-19] 18  SpO2:  [94 %-98 %] 96 %  BP: (152-214)/() 154/69     Weight: 90.7 kg (199 lb 15.3 oz)  Body mass index is 36.57 kg/m².    SpO2: 96 %  O2 Device (Oxygen Therapy): room air      Intake/Output Summary (Last 24 hours) at 6/20/2022 0718  Last data filed at 6/19/2022 1559  Gross per 24 hour   Intake 240 ml   Output --   Net 240 ml       Lines/Drains/Airways       Peripheral Intravenous Line  Duration                  Peripheral IV - Single Lumen 06/18/22 1512 18 G Right Antecubital 1 day                    Physical Exam  Constitutional:       General: She is not in acute distress.     Appearance: She is well-developed. She is obese. She is not ill-appearing, toxic-appearing or diaphoretic.   HENT:      Head: Normocephalic and atraumatic.   Eyes:      General: No scleral icterus.     Extraocular Movements: Extraocular movements intact.      Conjunctiva/sclera: Conjunctivae normal.      Pupils: Pupils are equal, round, and reactive to light.   Neck:      Vascular: No JVD.      Trachea: No tracheal deviation.    Cardiovascular:      Rate and Rhythm: Normal rate and regular rhythm.      Heart sounds: S1 normal and S2 normal. No murmur heard.    No friction rub. No gallop.   Pulmonary:      Effort: Pulmonary effort is normal. No respiratory distress.      Breath sounds: Normal breath sounds. No stridor. No wheezing, rhonchi or rales.   Chest:      Chest wall: No tenderness.   Abdominal:      General: There is no distension.      Palpations: Abdomen is soft.   Musculoskeletal:         General: No swelling or tenderness. Normal range of motion.      Cervical back: Normal range of motion and neck supple. No rigidity.      Right lower leg: No edema.      Left lower leg: No edema.   Skin:     General: Skin is warm and dry.      Coloration: Skin is not jaundiced.   Neurological:      General: No focal deficit present.      Mental Status: She is alert and oriented to person, place, and time.      Cranial Nerves: No cranial nerve deficit.   Psychiatric:         Mood and Affect: Mood normal.         Behavior: Behavior normal.       Current Medications:   aspirin  81 mg Oral Daily    atorvastatin  80 mg Oral Daily    famotidine  20 mg Oral BID    hydrALAZINE  25 mg Oral Q8H    hydroCHLOROthiazide  25 mg Oral Daily    losartan  100 mg Oral Daily    metoprolol succinate  25 mg Oral Daily       acetaminophen, cloNIDine, glucagon (human recombinant), glucose, glucose, insulin aspart U-100, magnesium oxide, magnesium oxide, melatonin, naloxone, senna-docusate 8.6-50 mg, sodium chloride 0.9%    Laboratory (all labs reviewed):  CBC:  Recent Labs   Lab 05/10/20  1537 11/21/20 2246 06/19/22  0337   WBC 10.62 9.96 11.30   Hemoglobin 13.2 13.0 11.7 L   Hematocrit 41.2 40.8 37.0   Platelets 284 222 274       CHEMISTRIES:  Recent Labs   Lab 05/10/20  1537 11/21/20 2246 06/19/22  0337   Glucose 133 H 137 H 115 H   Sodium 140 134 L 138   Potassium 4.9 4.0 4.4   BUN 16 17 20   Creatinine 1.1 1.4 1.3   eGFR if  59 A 44 A 47 A    eGFR if non  51 A 38 A 41 A   Calcium 10.1 9.4 10.0       CARDIAC BIOMARKERS:  Recent Labs   Lab 11/21/20  2246 06/18/22  2115 06/19/22  0337    H  --   --    Troponin I  --  <0.006 <0.006       COAGS:        LIPIDS/LFTS:  Recent Labs   Lab 05/10/20  1537 11/21/20  2246 06/19/22  0337   AST 10 20 13   ALT 14 19 20       BNP:  Recent Labs   Lab 05/10/20  1537   BNP 86       TSH:        Free T4:        Diagnostic Results:  ECG (personally reviewed and interpreted tracing(s)):  6/18/22 1404 SR 63, low volt    Chest X-Ray (personally reviewed and interpreted image(s)): 6/18/22 NAD, aortic atherosclerosis    Echo: 6/18/22 (images personally reviewed and interpreted)  · The left ventricle is normal in size with mild eccentric hypertrophy and normal systolic function.  · The estimated ejection fraction is 70%.  · Grade II left ventricular diastolic dysfunction.  · Normal right ventricular size with normal right ventricular systolic function.  · The estimated PA systolic pressure is 34 mmHg.          Assessment and Plan:     * Chest pain  Atyp sxs in pt with mulf RF  MPI planned today  Assuming no significant ischemia, OK for discharge this pm    Hypertensive urgency  BP better controlled  Cont med rx    Diabetes mellitus, type 2  Per IM    Stage 3b chronic kidney disease  Monitor creat    GERD (gastroesophageal reflux disease)  Per IM    Hyperlipidemia associated with type 2 diabetes mellitus  Cont statin    Aortic atherosclerosis  Noted on CXR 6/18/22  Cont statin        VTE Risk Mitigation (From admission, onward)         Ordered     IP VTE HIGH RISK PATIENT  Once         06/18/22 1802     Place sequential compression device  Until discontinued         06/18/22 1802     Place KENYETTA hose  Until discontinued         06/18/22 1802                Thank you for your consult. I will follow-up with patient. Please contact us if you have any additional questions.    Josef Palmer MD  Cardiology   [facility] - Telemetry    Addendum 1040am:    L MPI 6/20/22 (images personally reviewed and interpreted)    Normal myocardial perfusion scan. There is no evidence of myocardial ischemia or infarction.    The visually estimated ejection fraction is normal during stress.    There is normal wall motion post stress.      No further inpat cardiac testing planned.  Cardiology will sign off, pls call with questions.

## 2022-06-20 NOTE — NURSING
Discharge instructions explained to the pt. Pt has some questions about discharge medication instructions. Message sent to Dr. Carlin, waiting for response. PIV and telemetry monitor removed. Discharge pending clarification from MD on meds.

## 2022-06-20 NOTE — DISCHARGE SUMMARY
[facility] Medicine  Discharge Summary      Patient Name: Samia Diaz  MRN: [id number]  Patient Class: OP- Observation  Admission Date: 6/18/2022  Hospital Length of Stay: 0 days  Discharge Date and Time:  06/20/2022 11:54 AM  Attending Physician: Raymon Carlin MD   Discharging Provider: Raymon Carlin MD  Primary Care Provider: Primary Doctor No      HPI:   Samia Diaz 72 y.o. female with history of CVA hypertension GERD diabetes presents the hospital chief complaint of abdominal pain.  She reports beginning yesterday she has had intermittent chest abdominal pain that she describes as a burning pressure that worsens with lying improved with sitting.  He has been associated with nausea.  Her last bowel movement was the day before yesterday.  She has attempted no treatments at home.  She is concerned as she has not been taking medicine for her acid reflux and has been eating tomatoes home.  There has been associated nausea without vomiting lightheadedness.  She denies fever shortness of breath vomiting leg swelling syncope dysuria melena hematuria hematemesis.    In the ED, CT abdomen pelvis without acute intra-abdominal abnormality CT without acute process chest x-ray without acute process troponin negative amylase negative COVID negative creatinine 1.3 LFTs within normal limits.      * No surgery found *      Hospital Course:   Patient presented with atypical chest pain which was more consistent with a GI etiology. Her initial labs on admission was mostly stable. Troponin was negative twice. EKG showed some nonspecific findings. She had a stress test done which was negative. She was started on PPI on discharge and advised to follow up with her PCP outpatient. Rest of her chronic medical conditions were adequately managed       Goals of Care Treatment Preferences:  Code Status: Full Code      Consults:   Consults (From admission, onward)        Status Ordering Provider     Inpatient  consult to Cardiology  Once        Provider:  Josef Palmer MD    Completed [facility]     Case Management/  Once        Provider:  (Not yet assigned)    Completed MARIA L MOTA          * Chest pain  Atypical chest pain  As pt has hx of SOB on minimal exertion will order stress test  She complains of epigastric pain with nausea and vomiting with radiation into her chest. Reports a history of GERD no longer taking medication, she reports eating tomatoes over the last few days. EKG without ST elevation, troponin negative, chest without acute process, CT abdomen without acute process.  Continue home aspirin/statin/beta blocker  Will  Start famotidine given history of GERD  Recent Labs   Lab 06/19/22  0337   TROPONINI <0.006       Telemetry  Will check lipase    Results for orders placed during the hospital encounter of 06/18/22    Echo    Interpretation Summary  · The left ventricle is normal in size with mild eccentric hypertrophy and normal systolic function.  · The estimated ejection fraction is 70%.  · Grade II left ventricular diastolic dysfunction.  · Normal right ventricular size with normal right ventricular systolic function.  · The estimated PA systolic pressure is 34 mmHg.      Aortic atherosclerosis        Hyperlipidemia associated with type 2 diabetes mellitus        GERD (gastroesophageal reflux disease)  Discharged on PPI      Stage 3b chronic kidney disease  Followed by nephrology outpatient. Baseline Cr of 1.3. Cr today of 1.3  Continue home losartan  Renal dose medications avoid nephrotoxic drugs monitor intake and output    History of CVA (cerebrovascular accident)  History of CVA denies any new symptoms  Continue home aspirin/statin    Diabetes mellitus, type 2  Lab Results   Component Value Date    HGBA1C 7.6 (H) 06/18/2022     Will start sliding scale insulin, diabetic diet, goal -180  A1c pending    Hypertensive urgency  Meds changed as blood pressure poorly  controlled  Will continue to monitor Bps  Start hydralazine and HCTZ      Final Active Diagnoses:    Diagnosis Date Noted POA    PRINCIPAL PROBLEM:  Chest pain [R07.9] 01/09/2021 Yes    Hyperlipidemia associated with type 2 diabetes mellitus [E11.69, E78.5] 06/20/2022 Yes    Aortic atherosclerosis [I70.0] 06/20/2022 Yes    GERD (gastroesophageal reflux disease) [K21.9] 06/19/2022 Yes    Diabetes mellitus, type 2 [E11.9] 06/18/2022 Yes    History of CVA (cerebrovascular accident) [Z86.73] 06/18/2022 Not Applicable    Stage 3b chronic kidney disease [N18.32] 06/18/2022 Yes    Hypertensive urgency [I16.0] 01/09/2021 Yes      Problems Resolved During this Admission:       Discharged Condition: stable    Disposition: Home or Self Care    Follow Up:   Follow-up Information     [facility]. Schedule an appointment as soon as possible for a visit in 1 week(s).    Why: [facility] Follow-up in 1 week  Contact information:  [facility] [place] [address]  [phone]                       Patient Instructions:      Ambulatory referral/consult to Gastroenterology   Standing Status: Future   Referral Priority: Routine Referral Type: Consultation   Referral Reason: Specialty Services Required   Requested Specialty: Gastroenterology   Number of Visits Requested: 1     Activity as tolerated       Significant Diagnostic Studies: Labs:   BMP:   Recent Labs   Lab 06/19/22  0337   *      K 4.4      CO2 19*   BUN 20   CREATININE 1.3   CALCIUM 10.0       Pending Diagnostic Studies:     Procedure Component Value Units Date/Time    NM Myocardial Perfusion Spect Multi Pharmacologic [[id number]]     Order Status: Sent Lab Status: No result          Medications:  Reconciled Home Medications:      Medication List      START taking these medications    pantoprazole 40 MG tablet  Commonly known as: PROTONIX  Take 1 tablet (40 mg total) by mouth once daily. for 14 days      polyethylene glycol 17 gram Pwpk  Commonly known as: GLYCOLAX  Take 17 g by mouth once daily.        CHANGE how you take these medications    atenoloL 50 MG tablet  Commonly known as: TENORMIN  Take 1 tablet (50 mg total) by mouth once daily.  What changed: Another medication with the same name was removed. Continue taking this medication, and follow the directions you see here.        CONTINUE taking these medications    albuterol 90 mcg/actuation inhaler  Commonly known as: PROVENTIL/VENTOLIN HFA  Inhale 1-2 puffs into the lungs every 6 (six) hours as needed for Wheezing or Shortness of Breath. Rescue     atorvastatin 80 MG tablet  Commonly known as: LIPITOR  Take 80 mg by mouth once daily.     benazepril-hydrochlorthiazide 20-25 mg Tab  Commonly known as: LOTENSIN HCT  Take 1 tablet by mouth once daily.     cloNIDine 0.1 MG tablet  Commonly known as: CATAPRES  Take 0.1 mg by mouth 2 (two) times daily.     gabapentin 100 MG capsule  Commonly known as: NEURONTIN  Take 100 mg by mouth 2 (two) times daily.     loratadine 10 mg tablet  Commonly known as: CLARITIN  Take 1 tablet (10 mg total) by mouth once daily.     metFORMIN 750 MG ER 24hr tablet  Commonly known as: GLUCOPHAGE-XR  Take 1,000 mg by mouth daily with breakfast.     ondansetron 8 MG Tbdl  Commonly known as: ZOFRAN-ODT  Take 1 tablet (8 mg total) by mouth every 6 (six) hours as needed (nausea).        STOP taking these medications    losartan 100 MG tablet  Commonly known as: COZAAR            Indwelling Lines/Drains at time of discharge:   Lines/Drains/Airways     None                 Time spent on the discharge of patient: 20 minutes         Raymon Carlin MD  Department of Hospital Medicine  [facility] [place] [facility]

## 2022-06-20 NOTE — PLAN OF CARE
Problem: Adult Inpatient Plan of Care  Goal: Plan of Care Review  Outcome: Met  Goal: Patient-Specific Goal (Individualized)  Outcome: Met  Goal: Absence of Hospital-Acquired Illness or Injury  Outcome: Met  Goal: Optimal Comfort and Wellbeing  6/20/2022 1325 by Rosalina Aldrich RN  Outcome: Met  6/20/2022 1138 by Rosalina Aldrich RN  Outcome: Ongoing, Progressing  Goal: Readiness for Transition of Care  Outcome: Met     Problem: Diabetes Comorbidity  Goal: Blood Glucose Level Within Targeted Range  Outcome: Met     Problem: Hypertension Acute  Goal: Blood Pressure Within Desired Range  Outcome: Met     Problem: Coping Ineffective  Goal: Effective Coping  Outcome: Met     Problem: Fatigue  Goal: Improved Activity Tolerance  Outcome: Met     Problem: Adult Inpatient Plan of Care  Goal: Plan of Care Review  Outcome: Met  Goal: Patient-Specific Goal (Individualized)  Outcome: Met  Goal: Absence of Hospital-Acquired Illness or Injury  Outcome: Met  Goal: Optimal Comfort and Wellbeing  6/20/2022 1325 by Rosalina Aldrich RN  Outcome: Met  6/20/2022 1138 by Rosalina Aldrich RN  Outcome: Ongoing, Progressing  Goal: Readiness for Transition of Care  Outcome: Met

## 2022-06-20 NOTE — SUBJECTIVE & OBJECTIVE
Past Medical History:   Diagnosis Date    Diabetes mellitus     GERD (gastroesophageal reflux disease)     Hyperlipidemia     Hypertension     Stroke        Past Surgical History:   Procedure Laterality Date    Abdominal Mesh Placement         Review of patient's allergies indicates:   Allergen Reactions    Sulfa (sulfonamide antibiotics)        No current facility-administered medications on file prior to encounter.     Current Outpatient Medications on File Prior to Encounter   Medication Sig    atenoloL (TENORMIN) 50 MG tablet Take 50 mg by mouth once daily.    atorvastatin (LIPITOR) 80 MG tablet Take 80 mg by mouth once daily.    cloNIDine (CATAPRES) 0.1 MG tablet Take 0.1 mg by mouth 2 (two) times daily.    gabapentin (NEURONTIN) 100 MG capsule Take 100 mg by mouth 2 (two) times daily.    losartan (COZAAR) 100 MG tablet Take 100 mg by mouth once daily.    metFORMIN (GLUCOPHAGE-XR) 750 MG ER 24hr tablet Take 1,000 mg by mouth daily with breakfast.    albuterol (PROVENTIL/VENTOLIN HFA) 90 mcg/actuation inhaler Inhale 1-2 puffs into the lungs every 6 (six) hours as needed for Wheezing or Shortness of Breath. Rescue    benazepril-hydrochlorthiazide (LOTENSIN HCT) 20-25 mg Tab Take 1 tablet by mouth once daily.    loratadine (CLARITIN) 10 mg tablet Take 1 tablet (10 mg total) by mouth once daily.    ondansetron (ZOFRAN-ODT) 8 MG TbDL Take 1 tablet (8 mg total) by mouth every 6 (six) hours as needed (nausea).     Family History    None       Tobacco Use    Smoking status: Never Smoker    Smokeless tobacco: Never Used   Substance and Sexual Activity    Alcohol use: Not Currently    Drug use: Never    Sexual activity: Not Currently     Partners: Male     Review of Systems   Constitutional: Negative for chills, diaphoresis, fever and malaise/fatigue.   HENT:  Negative for nosebleeds.    Eyes:  Negative for blurred vision and double vision.   Cardiovascular:  Positive for chest pain. Negative for claudication, cyanosis,  dyspnea on exertion, leg swelling, orthopnea, palpitations, paroxysmal nocturnal dyspnea and syncope.   Respiratory:  Negative for cough, shortness of breath and wheezing.    Skin:  Negative for dry skin and poor wound healing.   Musculoskeletal:  Negative for back pain, joint swelling and myalgias.   Gastrointestinal:  Positive for abdominal pain. Negative for nausea and vomiting.   Genitourinary:  Negative for hematuria.   Neurological:  Negative for dizziness, headaches, numbness, seizures and weakness.   Psychiatric/Behavioral:  Negative for altered mental status and depression.    Objective:     Vital Signs (Most Recent):  Temp: 98.3 °F (36.8 °C) (06/20/22 0435)  Pulse: (!) 58 (06/20/22 0435)  Resp: 18 (06/20/22 0435)  BP: (!) 154/69 (06/20/22 0435)  SpO2: 96 % (06/20/22 0435)   Vital Signs (24h Range):  Temp:  [98 °F (36.7 °C)-98.7 °F (37.1 °C)] 98.3 °F (36.8 °C)  Pulse:  [58-66] 58  Resp:  [18-19] 18  SpO2:  [94 %-98 %] 96 %  BP: (152-214)/() 154/69     Weight: 90.7 kg (199 lb 15.3 oz)  Body mass index is 36.57 kg/m².    SpO2: 96 %  O2 Device (Oxygen Therapy): room air      Intake/Output Summary (Last 24 hours) at 6/20/2022 0718  Last data filed at 6/19/2022 1559  Gross per 24 hour   Intake 240 ml   Output --   Net 240 ml       Lines/Drains/Airways       Peripheral Intravenous Line  Duration                  Peripheral IV - Single Lumen 06/18/22 1512 18 G Right Antecubital 1 day                    Physical Exam  Constitutional:       General: She is not in acute distress.     Appearance: She is well-developed. She is obese. She is not ill-appearing, toxic-appearing or diaphoretic.   HENT:      Head: Normocephalic and atraumatic.   Eyes:      General: No scleral icterus.     Extraocular Movements: Extraocular movements intact.      Conjunctiva/sclera: Conjunctivae normal.      Pupils: Pupils are equal, round, and reactive to light.   Neck:      Vascular: No JVD.      Trachea: No tracheal deviation.    Cardiovascular:      Rate and Rhythm: Normal rate and regular rhythm.      Heart sounds: S1 normal and S2 normal. No murmur heard.    No friction rub. No gallop.   Pulmonary:      Effort: Pulmonary effort is normal. No respiratory distress.      Breath sounds: Normal breath sounds. No stridor. No wheezing, rhonchi or rales.   Chest:      Chest wall: No tenderness.   Abdominal:      General: There is no distension.      Palpations: Abdomen is soft.   Musculoskeletal:         General: No swelling or tenderness. Normal range of motion.      Cervical back: Normal range of motion and neck supple. No rigidity.      Right lower leg: No edema.      Left lower leg: No edema.   Skin:     General: Skin is warm and dry.      Coloration: Skin is not jaundiced.   Neurological:      General: No focal deficit present.      Mental Status: She is alert and oriented to person, place, and time.      Cranial Nerves: No cranial nerve deficit.   Psychiatric:         Mood and Affect: Mood normal.         Behavior: Behavior normal.       Current Medications:   aspirin  81 mg Oral Daily    atorvastatin  80 mg Oral Daily    famotidine  20 mg Oral BID    hydrALAZINE  25 mg Oral Q8H    hydroCHLOROthiazide  25 mg Oral Daily    losartan  100 mg Oral Daily    metoprolol succinate  25 mg Oral Daily       acetaminophen, cloNIDine, glucagon (human recombinant), glucose, glucose, insulin aspart U-100, magnesium oxide, magnesium oxide, melatonin, naloxone, senna-docusate 8.6-50 mg, sodium chloride 0.9%    Laboratory (all labs reviewed):  CBC:  Recent Labs   Lab 05/10/20  1537 11/21/20 2246 06/19/22  0337   WBC 10.62 9.96 11.30   Hemoglobin 13.2 13.0 11.7 L   Hematocrit 41.2 40.8 37.0   Platelets 284 222 274       CHEMISTRIES:  Recent Labs   Lab 05/10/20  1537 11/21/20  2246 06/19/22  0337   Glucose 133 H 137 H 115 H   Sodium 140 134 L 138   Potassium 4.9 4.0 4.4   BUN 16 17 20   Creatinine 1.1 1.4 1.3   eGFR if  59 A 44 A 47 A   eGFR  if non  51 A 38 A 41 A   Calcium 10.1 9.4 10.0       CARDIAC BIOMARKERS:  Recent Labs   Lab 11/21/20  2246 06/18/22  2115 06/19/22  0337    H  --   --    Troponin I  --  <0.006 <0.006       COAGS:        LIPIDS/LFTS:  Recent Labs   Lab 05/10/20  1537 11/21/20  2246 06/19/22  0337   AST 10 20 13   ALT 14 19 20       BNP:  Recent Labs   Lab 05/10/20  1537   BNP 86       TSH:        Free T4:        Diagnostic Results:  ECG (personally reviewed and interpreted tracing(s)):  6/18/22 1404 SR 63, low volt    Chest X-Ray (personally reviewed and interpreted image(s)): 6/18/22 NAD, aortic atherosclerosis    Echo: 6/18/22 (images personally reviewed and interpreted)  The left ventricle is normal in size with mild eccentric hypertrophy and normal systolic function.  The estimated ejection fraction is 70%.  Grade II left ventricular diastolic dysfunction.  Normal right ventricular size with normal right ventricular systolic function.  The estimated PA systolic pressure is 34 mmHg.

## 2022-06-20 NOTE — HPI
72 y.o. female with history of CVA hypertension GERD diabetes presents the hospital chief complaint of abdominal pain.  She reports beginning yesterday she has had intermittent chest abdominal pain that she describes as a burning pressure that worsens with lying improved with sitting.  He has been associated with nausea.  Her last bowel movement was the day before yesterday.  She has attempted no treatments at home.  She is concerned as she has not been taking medicine for her acid reflux and has been eating tomatoes home.  There has been associated nausea without vomiting lightheadedness.  She denies fever shortness of breath vomiting leg swelling syncope dysuria melena hematuria hematemesis.  In the ED, CT abdomen pelvis without acute intra-abdominal abnormality CT without acute process chest x-ray without acute process troponin negative amylase negative COVID negative creatinine 1.3 LFTs within normal limits.    Cardiology consulted for CP.    Patient presents with complaints of abdominal and chest discomfort.  Her blood pressure was elevated, which is under better control.  She does not have any more chest discomfort, but still having some abdominal discomfort which has been attributed to GERD.  She does have multiple risk factors for CAD.  Stress testing is planned.

## 2022-06-20 NOTE — ASSESSMENT & PLAN NOTE
Atyp sxs in pt with mulf RF  MPI planned today  Assuming no significant ischemia, OK for discharge this pm

## 2022-06-20 NOTE — NURSING
Pt returned to the floor from nuclear medicine. Pt just complains of being tired. Pt lying in bed, no evident distress. /79; morning meds to be administered as ordered.

## 2022-06-20 NOTE — PLAN OF CARE
06/20/22 1228   Final Note   Assessment Type Final Discharge Note   Anticipated Discharge Disposition Home   Hospital Resources/Appts/Education Provided Community resources provided;Post-Acute resouces added to AVS   Post-Acute Status   Post-Acute Authorization Other   Other Status No Post-Acute Service Needs   Pts nurse Gerson notified that the pt can d/c from CM standpoint.

## 2022-06-20 NOTE — NURSING
Patient attempted to do exercise stress test, walked 56 seconds, peak VS Hr 103, /65, start VS HR 69 /65, stopped due to patient unable to keep up with speed of treadmill

## 2022-06-20 NOTE — HOSPITAL COURSE
Patient presented with atypical chest pain which was more consistent with a GI etiology. Her initial labs on admission was mostly stable. Troponin was negative twice. EKG showed some nonspecific findings. She had a stress test done which was negative. She was started on PPI on discharge and advised to follow up with her PCP outpatient. Rest of her chronic medical conditions were adequately managed

## 2022-06-20 NOTE — ASSESSMENT & PLAN NOTE
Lab Results   Component Value Date    HGBA1C 7.6 (H) 06/18/2022     Will start sliding scale insulin, diabetic diet, goal -180  A1c pending

## 2023-04-14 ENCOUNTER — HOSPITAL ENCOUNTER (EMERGENCY)
Facility: HOSPITAL | Age: 73
Discharge: HOME | End: 2023-04-14
Attending: EMERGENCY MEDICINE
Payer: MEDICAID

## 2023-04-14 VITALS
TEMPERATURE: 98 F | OXYGEN SATURATION: 97 % | DIASTOLIC BLOOD PRESSURE: 72 MMHG | RESPIRATION RATE: 18 BRPM | HEART RATE: 68 BPM | HEIGHT: 62 IN | WEIGHT: 204.13 LBS | BODY MASS INDEX: 37.56 KG/M2 | SYSTOLIC BLOOD PRESSURE: 159 MMHG

## 2023-04-14 DIAGNOSIS — R03.0 ELEVATED BLOOD PRESSURE READING: ICD-10-CM

## 2023-04-14 DIAGNOSIS — R19.7 NAUSEA, VOMITING, AND DIARRHEA: ICD-10-CM

## 2023-04-14 DIAGNOSIS — R11.2 NAUSEA, VOMITING, AND DIARRHEA: ICD-10-CM

## 2023-04-14 DIAGNOSIS — G44.209 ACUTE NON INTRACTABLE TENSION-TYPE HEADACHE: Primary | ICD-10-CM

## 2023-04-14 LAB
BILIRUBIN, POC UA: NEGATIVE
BLOOD, POC UA: NEGATIVE
CLARITY, POC UA: CLEAR
COLOR, POC UA: YELLOW
CTP QC/QA: YES
GLUCOSE, POC UA: NEGATIVE
INFLUENZA A ANTIGEN, POC: NEGATIVE
INFLUENZA B ANTIGEN, POC: NEGATIVE
KETONES, POC UA: NEGATIVE
LEUKOCYTE EST, POC UA: ABNORMAL
NITRITE, POC UA: NEGATIVE
PH UR STRIP: 6 [PH]
POCT GLUCOSE: 155 MG/DL (ref 70–110)
PROTEIN, POC UA: NEGATIVE
SARS-COV-2 RDRP RESP QL NAA+PROBE: NEGATIVE
SPECIFIC GRAVITY, POC UA: 1.01
UROBILINOGEN, POC UA: 0.2 E.U./DL

## 2023-04-14 PROCEDURE — 81003 URINALYSIS AUTO W/O SCOPE: CPT | Mod: ER

## 2023-04-14 PROCEDURE — 25000003 PHARM REV CODE 250: Mod: ER | Performed by: EMERGENCY MEDICINE

## 2023-04-14 PROCEDURE — 99282 EMERGENCY DEPT VISIT SF MDM: CPT | Mod: ER

## 2023-04-14 PROCEDURE — 82962 GLUCOSE BLOOD TEST: CPT | Mod: ER

## 2023-04-14 RX ORDER — ACETAMINOPHEN 325 MG/1
650 TABLET ORAL
Status: COMPLETED | OUTPATIENT
Start: 2023-04-14 | End: 2023-04-14

## 2023-04-14 RX ADMIN — ACETAMINOPHEN 650 MG: 325 TABLET ORAL at 04:04

## 2023-04-14 NOTE — ED PROVIDER NOTES
Encounter Date: 4/14/2023       History     Source of history:  Patient.    History obtained without limitations.      HPI:  The patient is a 72-year-old with the below past medical history who presents by personal transportation with her granddaughter.  She complains of headache that began two days ago.  The pain is located on the left side of the crown and extends to the left skull base.  She is unable to characterize the pain.  It has waxing and waning intensity.  She attempted treatment by taking 200 mg of ibuprofen once.  Her headache was unrelieved with this therapy.  She has associated nausea and has vomited once.  Additionally, she has diarrhea.  She denies vision changes, hearing loss, neck pain and stiffness, fever, chills, chest pain, palpitations, shortness of breath, abdominal pain, and fainting.  There are no exacerbating factors.  The patient frequently eats one meal daily because she fears that eating will cause her blood pressure to rise.  This results in her feeling fatigued.      Review of patient's allergies indicates:   Allergen Reactions    Sulfa (sulfonamide antibiotics)      Past Medical History:   Diagnosis Date    Diabetes mellitus     GERD (gastroesophageal reflux disease)     Hyperlipidemia     Hypertension     Stroke      Past Surgical History:   Procedure Laterality Date    Abdominal Mesh Placement       No family history on file.  Social History     Tobacco Use    Smoking status: Never    Smokeless tobacco: Never   Substance Use Topics    Alcohol use: Not Currently    Drug use: Never     Review of Systems  See HPI.  Remainder of 10 point systems review negative.    Physical Exam     Initial Vitals [04/14/23 1519]   BP Pulse Resp Temp SpO2   (!) 159/72 68 18 98.1 °F (36.7 °C) 97 %      MAP       --         Physical Exam    Nursing note and vitals reviewed.  Constitutional: She is not diaphoretic. No distress.   HENT:   Head: Normocephalic and atraumatic.   Mouth/Throat: Oropharynx is  clear and moist.   No facial swelling or tenderness.  No scalp swelling, lesions, or tenderness.    Right ear:  Normal pinna.  No mastoid process tenderness.  Small amount of cerumen in the canal.  Normal TM.   Eyes: Conjunctivae and EOM are normal. Pupils are equal, round, and reactive to light. No scleral icterus.   No nystagmus.   Neck: No JVD present.   No nuchal rigidity.   Cardiovascular:  Normal rate, regular rhythm and normal heart sounds.     Exam reveals no gallop and no friction rub.       No murmur heard.  Pulmonary/Chest: Effort normal and breath sounds normal. No stridor. No respiratory distress. She has no decreased breath sounds. She has no wheezes. She has no rhonchi. She has no rales.   Abdominal: Abdomen is soft. She exhibits no distension. There is no abdominal tenderness.     Neurological: She is alert and oriented to person, place, and time. No cranial nerve deficit. GCS score is 15. GCS eye subscore is 4. GCS verbal subscore is 5. GCS motor subscore is 6.   5/5 strength and normal touch sensation throughout all extremities.  Negative Romberg.  Normal gait.   Skin: Skin is warm and dry. No pallor.       ED Course   Procedures  Labs Reviewed   POCT URINALYSIS W/O SCOPE - Abnormal; Notable for the following components:       Result Value    Leukocytes, UA 2+ (*)     All other components within normal limits   POCT GLUCOSE - Abnormal; Notable for the following components:    POCT Glucose 155 (*)     All other components within normal limits   SARS-COV-2 RDRP GENE    Narrative:     This test utilizes isothermal nucleic acid amplification technology to detect the SARS-CoV-2 RdRp nucleic acid segment. The analytical sensitivity (limit of detection) is 500 copies/swab.     A POSITIVE result is indicative of the presence of SARS-CoV-2 RNA; clinical correlation with patient history and other diagnostic information is necessary to determine patient infection status.    A NEGATIVE result means that  SARS-CoV-2 nucleic acids are not present above the limit of detection. A NEGATIVE result should be treated as presumptive. It does not rule out the possibility of COVID-19 and should not be the sole basis for treatment decisions. If COVID-19 is strongly suspected based on clinical and exposure history, re-testing using an alternate molecular assay should be considered.     This test is only for use under the Food and Drug Administration s Emergency Use Authorization (EUA).     Commercial kits are provided by [employer]. Performance characteristics of the EUA have been independently verified by [facility] Department of Pathology and Laboratory Medicine.   _________________________________________________________________   The authorized Fact Sheet for Healthcare Providers and the authorized Fact Sheet for Patients of the ID NOW COVID-19 are available on the FDA website:    https://www.fda.gov/media/201772/download      https://www.fda.gov/media/300215/download      POCT URINALYSIS W/O SCOPE   POCT RAPID INFLUENZA A/B   POCT GLUCOSE MONITORING CONTINUOUS          Imaging Results    None          Medications   acetaminophen tablet 650 mg (650 mg Oral Given 4/14/23 1616)     Medical Decision Making:   Clinical Tests:   Lab Tests: Reviewed    MDM:   This was an urgent evaluation for the aforementioned.  Differential for headache included tension headache, cluster headache, migraine headache, meningitis, vasculitis, cellulitis, and other conditions.  Her presentation, exam, workup were most consistent with a tension headache.  She was given acetaminophen which improved her headache.  Given diarrhea, nausea, vomiting, there is a strong possibility that the headache was a part of a viral syndrome.  Her reported nutritional deficiency was likely also a factor.  There was no indication for emergent imaging of the brain.  Her blood pressure was mildly elevated and did not require emergent treatment.  She  was given instructions for supportive management and instructed to arrange close follow-up with her primary care provider.  Standard ED return precautions were given.                        Clinical Impression:   Final diagnoses:  [G44.209] Acute non intractable tension-type headache (Primary)  [R11.2, R19.7] Nausea, vomiting, and diarrhea  [R03.0] Elevated blood pressure reading        ED Disposition Condition    Discharge Stable          ED Prescriptions    None       Follow-up Information       Follow up With Specialties Details Why Contact Info    Your primary care provider   Schedule a close ER follow-up visit with your primary care provider.     ER   Return to the ER for worsened symptoms or for any other concerns.              Donavan Browne III, MD  04/15/23 8425

## 2024-11-08 DIAGNOSIS — R06.02 SOB (SHORTNESS OF BREATH): Primary | ICD-10-CM

## 2024-11-18 DIAGNOSIS — R06.02 SOB (SHORTNESS OF BREATH): Primary | ICD-10-CM

## 2024-11-19 ENCOUNTER — HOSPITAL ENCOUNTER (OUTPATIENT)
Dept: RADIOLOGY | Facility: HOSPITAL | Age: 74
Discharge: HOME | End: 2024-11-19
Attending: FAMILY MEDICINE
Payer: MEDICAID

## 2024-11-19 DIAGNOSIS — R06.02 SOB (SHORTNESS OF BREATH): ICD-10-CM

## 2024-11-19 LAB
CREAT SERPL-MCNC: 1.4 MG/DL (ref 0.5–1.4)
SAMPLE: NORMAL

## 2024-11-19 PROCEDURE — 25500020 PHARM REV CODE 255: Performed by: FAMILY MEDICINE

## 2024-11-19 PROCEDURE — 71275 CT ANGIOGRAPHY CHEST: CPT | Mod: 26,,, | Performed by: STUDENT IN AN ORGANIZED HEALTH CARE EDUCATION/TRAINING PROGRAM

## 2024-11-19 PROCEDURE — 71275 CT ANGIOGRAPHY CHEST: CPT | Mod: TC

## 2024-11-19 RX ADMIN — IOHEXOL 100 ML: 350 INJECTION, SOLUTION INTRAVENOUS at 08:11
